# Patient Record
Sex: FEMALE | Race: WHITE | NOT HISPANIC OR LATINO | Employment: FULL TIME | URBAN - METROPOLITAN AREA
[De-identification: names, ages, dates, MRNs, and addresses within clinical notes are randomized per-mention and may not be internally consistent; named-entity substitution may affect disease eponyms.]

---

## 2022-10-12 ENCOUNTER — HOSPITAL ENCOUNTER (OUTPATIENT)
Dept: RADIOLOGY | Facility: HOSPITAL | Age: 42
Discharge: HOME | End: 2022-10-12
Attending: ORTHOPAEDIC SURGERY
Payer: COMMERCIAL

## 2022-10-12 ENCOUNTER — HOSPITAL ENCOUNTER (OUTPATIENT)
Dept: RADIOLOGY | Facility: HOSPITAL | Age: 42
Discharge: HOME | End: 2022-10-12
Attending: STUDENT IN AN ORGANIZED HEALTH CARE EDUCATION/TRAINING PROGRAM
Payer: COMMERCIAL

## 2022-10-12 ENCOUNTER — OFFICE VISIT (OUTPATIENT)
Dept: SPORTS MEDICINE | Facility: CLINIC | Age: 42
End: 2022-10-12
Payer: COMMERCIAL

## 2022-10-12 VITALS
BODY MASS INDEX: 24.91 KG/M2 | SYSTOLIC BLOOD PRESSURE: 108 MMHG | HEIGHT: 66 IN | DIASTOLIC BLOOD PRESSURE: 78 MMHG | WEIGHT: 155 LBS | HEART RATE: 66 BPM

## 2022-10-12 DIAGNOSIS — M17.11 ARTHRITIS OF KNEE, RIGHT: ICD-10-CM

## 2022-10-12 DIAGNOSIS — Z98.890 S/P RIGHT KNEE SURGERY: ICD-10-CM

## 2022-10-12 DIAGNOSIS — G89.29 CHRONIC PAIN OF BOTH KNEES: ICD-10-CM

## 2022-10-12 DIAGNOSIS — Z98.890 S/P LEFT KNEE SURGERY: ICD-10-CM

## 2022-10-12 DIAGNOSIS — M25.561 CHRONIC PAIN OF BOTH KNEES: ICD-10-CM

## 2022-10-12 DIAGNOSIS — M25.562 CHRONIC PAIN OF BOTH KNEES: Primary | ICD-10-CM

## 2022-10-12 DIAGNOSIS — M25.562 CHRONIC PAIN OF BOTH KNEES: ICD-10-CM

## 2022-10-12 DIAGNOSIS — G89.29 CHRONIC PAIN OF BOTH KNEES: Primary | ICD-10-CM

## 2022-10-12 DIAGNOSIS — M25.561 CHRONIC PAIN OF BOTH KNEES: Primary | ICD-10-CM

## 2022-10-12 DIAGNOSIS — M17.11 PRIMARY OSTEOARTHRITIS OF RIGHT KNEE: ICD-10-CM

## 2022-10-12 PROCEDURE — 99999 PR PBB SHADOW E&M-NEW PATIENT-LVL III: ICD-10-PCS | Mod: PBBFAC,,, | Performed by: ORTHOPAEDIC SURGERY

## 2022-10-12 PROCEDURE — 3008F BODY MASS INDEX DOCD: CPT | Mod: CPTII,S$GLB,, | Performed by: ORTHOPAEDIC SURGERY

## 2022-10-12 PROCEDURE — 73564 X-RAY EXAM KNEE 4 OR MORE: CPT | Mod: 26,,, | Performed by: RADIOLOGY

## 2022-10-12 PROCEDURE — 3078F PR MOST RECENT DIASTOLIC BLOOD PRESSURE < 80 MM HG: ICD-10-PCS | Mod: CPTII,S$GLB,, | Performed by: ORTHOPAEDIC SURGERY

## 2022-10-12 PROCEDURE — 3008F PR BODY MASS INDEX (BMI) DOCUMENTED: ICD-10-PCS | Mod: CPTII,S$GLB,, | Performed by: ORTHOPAEDIC SURGERY

## 2022-10-12 PROCEDURE — 3078F DIAST BP <80 MM HG: CPT | Mod: CPTII,S$GLB,, | Performed by: ORTHOPAEDIC SURGERY

## 2022-10-12 PROCEDURE — 99204 PR OFFICE/OUTPT VISIT, NEW, LEVL IV, 45-59 MIN: ICD-10-PCS | Mod: S$GLB,,, | Performed by: ORTHOPAEDIC SURGERY

## 2022-10-12 PROCEDURE — 77073 BONE LENGTH STUDIES: CPT | Mod: TC

## 2022-10-12 PROCEDURE — 73564 XR KNEE ORTHO BILAT WITH FLEXION: ICD-10-PCS | Mod: 26,,, | Performed by: RADIOLOGY

## 2022-10-12 PROCEDURE — 3074F PR MOST RECENT SYSTOLIC BLOOD PRESSURE < 130 MM HG: ICD-10-PCS | Mod: CPTII,S$GLB,, | Performed by: ORTHOPAEDIC SURGERY

## 2022-10-12 PROCEDURE — 73564 X-RAY EXAM KNEE 4 OR MORE: CPT | Mod: TC,50

## 2022-10-12 PROCEDURE — 99999 PR PBB SHADOW E&M-NEW PATIENT-LVL III: CPT | Mod: PBBFAC,,, | Performed by: ORTHOPAEDIC SURGERY

## 2022-10-12 PROCEDURE — 73560 X-RAY EXAM OF KNEE 1 OR 2: CPT | Mod: TC,RT

## 2022-10-12 PROCEDURE — 73560 X-RAY EXAM OF KNEE 1 OR 2: CPT | Mod: 26,RT,, | Performed by: RADIOLOGY

## 2022-10-12 PROCEDURE — 99204 OFFICE O/P NEW MOD 45 MIN: CPT | Mod: S$GLB,,, | Performed by: ORTHOPAEDIC SURGERY

## 2022-10-12 PROCEDURE — 77073 BONE LENGTH STUDIES: CPT | Mod: 26,,, | Performed by: RADIOLOGY

## 2022-10-12 PROCEDURE — 73560 XR KNEE 1 OR 2 VIEW RIGHT: ICD-10-PCS | Mod: 26,RT,, | Performed by: RADIOLOGY

## 2022-10-12 PROCEDURE — 3074F SYST BP LT 130 MM HG: CPT | Mod: CPTII,S$GLB,, | Performed by: ORTHOPAEDIC SURGERY

## 2022-10-12 PROCEDURE — 77073 XR HIP TO ANKLE: ICD-10-PCS | Mod: 26,,, | Performed by: RADIOLOGY

## 2022-10-12 NOTE — PATIENT INSTRUCTIONS
The knee is the most frequently injured joint in the body. Most injuries are due to the extreme stresses during twisting or turning activities or sports. The knee joint is made up of three bones, four major ligaments and two types of cartilage.    FEMUR (Thigh Bone)  The femoral condyles are the two rounded prominences at the end of the femur. The motion of the condyles include rocking, gliding and rotating. Any abnormal surface structure or cartilage damage can lead to cartilage breakdown and arthritis (loss of cartilage padding).    TIBIA (Shin Bone)  The Tibia meets the Femur at the knee in two areas on which the Femur rides. This area is called the Tibial Plateau.    PATELLA (Knee Cap)  The Patella is a bone that lies within the quadriceps tendon. It rides in the shallow groove over the front part if the Femur called the Trochlea. The Patella acts as a lever arm to help the quadriceps muscle extend the knee.    Articular Cartilage covers the ends of these bones at the knee joint. This glistening white substance has the consistency of firm rubber but is actually a mixture of collagen and special large sponge-like molecules all maintained by living cartilage cells (chondrocytes). With normal joint fluid for lubrication, the surface is more slippery than ice on ice and allows smooth and easy knee joint motion.      MENISCUS  The other type of knee cartilage is the Meniscal Cartilage (fibrocartilage). These C-shaped pads are found between the thigh bone and shin bone -- one on each side -- thus called the Medial Meniscus (inner thigh aspect) and Lateral Meniscus (outer aspect). The menisci are attached to the Tibial Plateaus, and serve to cushion and transfer joint force more evenly to the tibia. They accomplish this by distributing joint forces over a larger area of the joint -- transferring force from the curved femoral condylar margins to the flatter tibial plateaus. Damage to the meniscal cushion may result  "in increased stress on the articular cartilage of the tibia and femur and early arthritis.      The smooth, white shiny covering of the bones in the joint is known as Articular Cartilage, and is made of a material called "hyaline cartilage". Damage to this articular cartilage can occur from trauma (such as a fall or car accident), but more often, it is the result of repetitive injuries over a long period of time.    Articular cartilage injuries are common, occurring in 20- 70% of knee injuries. The function of articular cartilage is to optimize joint function by reducing friction and increasing shock absorption. Articular cartilage is similar to the "tread on a car tire".    Injuries to the articular cartilage have a low capacity for healing. Both superficial and full-thickness cartilage injuries may progress to the mechanical wear and breakdown of the cartilage matrix.  The breakdown of articular cartilage will eventually cause osteoarthritis, which is a very serious painful condition. With a full-thickness cartilage injury, continued activity may cause the articular cartilage injury to progress rapidly to traumatic arthritis. The larger the initial cartilage injury, the faster the potential progression of arthritis. Articular cartilage injury or wear leads to joint pain.      Common symptoms include pain when the joint is moved or loaded (like walking or running). Catching, locking, or creaking (crepitation) may occur with joint motion or weight bearing (like twisting or standing  from a seated position). Articular cartilage damage may be superficial (partial), deep (complete full-thickness), or osteochondral (bone and cartilage).    Superficial cartilage injuries extend into the upper 50% of the depth of the cartilage. These injuries typically do not heal, but may not progress to arthritis unless they are large and located in a weight-bearing area of the knee.  Deep or full-thickness lesions extend down to the " "bone, but not through it. These injuries have very little healing potential and tend to progress to osteoarthritis if located in a weight-bearing area.    Osteochondral (bone plus cartilage) injuries extend down through the subchondral bone (deep to the cartilage). These injuries may heal by allowing blood vessel ingrowth with fibrous cells to produce a fibrous (scar-like) tissue repair.      Treatment Options For Smaller Defects  Most studies suggest the repair tissue formed from bone marrow stimulation techniques is fibrocartilage (scar tissue -not hyaline cartilage), which is less resistant to wear with the forces in the knee joint and often deteriorates over time Bone marrow stimulation techniques can be successful in eliminating symptoms in many patients, especially young patients with small lesions.   Without early intervention, cartilage degeneration may proceed, and prevent a chance for successful pain- free function.    Arthroscopic Debridement  This is an arthroscopic procedure, often known as a "knee scope". The surgeon uses minimally invasive techniques with a small fiberoptic light source attached to a video camera to locate damaged cartilage and trim the loose edges away to smooth the surface. The surgeon may trim meniscus tears and remove loose cartilage that causes catching or locking symptoms and attempts to prevent any further flaking off that can irritate the knee joint lining and cause swelling.  Arthroscopic debridement is most effective for small lesions, less than 1 cm2 (3/8 inch). It is not as effective for larger lesions because it does not fill the lesion with any repair tissue, leaving the edges exposed to high forces in the knee and continued wear. Despite relieving some symptoms from cartilage tears or loss, arthroscopic cleaning does not prevent the progression of arthritis, but may relieve mechanical symptoms.    Bone Marrow Stimulation Techniques  Three different techniques are " "available to create bleeding and clot formation at the cartilage defect. Blood vessels and fibrous cells migrate to the area to form a fibrous scar-tissue repair tissue to fill the hole in the articular cartilage. Drilling creates multiple small holes through the subchondral bone to allow bleeding into the defect.   Microfracture or "picking" creates small fractures in the subchondral bone to encourage bleeding into the defect.      Abrasion arthroplasty is a superficial shaving of the subchondral bone surface to create bleeding into the defect. Bone marrow stimulation techniques are successful in eliminating symptoms in many patients, especially younger patients with smaller lesions well contained lesions.       For patients with more extensive cartilage damage there are several techniques available    Osteochondral Autograft  This technique is analogous to a hair-plug transfer. The surgeon removes a small section of the patient's own cartilage along with the underlying bone plug, hence the name osteochondral (bone and cartilage) graft.      Bone and cartilage cylinders are harvested from a minor weight bearing area of the knee joint and transplanted into prepared holes in the damage area. This process works much like a hair transplant. Unfortunately, a limited amount of normal osteochondral tissue is available for harvest. The typical size of defect treatable with this method is between 1 to 2 cm2.      Treatment Options For Larger Lesions    Autologous Chondrocyte Implantation (ACI) Carticel  For cartilage defects greater than 2 square centimeters or if there are multiple defects in the knee, one of the newer techniques for the cartilage regeneration, is ACI. ACI is FDA indicated for full-thickness cartilage or osteochondral lesions located in the knee.    ACI is performed in two stages. The first stage is performed when initially assessing the joint arthroscopically. A small amount of cartilage is harvested and " sent to a laboratory for cell culture and growth.    The patient's own cartilage cells (chondrocytes) are grown in culture increasing the number of cells by 10 fold.    Twelve million chondrocytes are then re-implanted into the cartilage defect and covered with a ceiling of native tissue (periosteum).    The cells then grow to fill the defect and resurface areas of cartilage loss with hyaline-like cartilage.    The long-term outcomes (of 14 years) are encouraging for treating medium and large cartilage lesions. ACI is the only procedure with a formal patient outcomes registry.    Osteochondral Allograft     For larger defects that involve both cartilage and bone loss, surgeons may custom fit the defect with a cadaver implant of donated cartilage and bone. The transplanted tissue is matched to the patient based on size of the knee, but tissue typing (similar to organ transplantation) is not necessary. Osteochondral transplantation may allow restoration of the joint surface. The long-term outcomes with fresh allograft (cadaver) tissue have been very encouraging.      RESTORING THE MENISCUS  Our goal is to maintain normal anatomy, if possible. In the case of meniscal tears, the first line of treatment is attempt at repair. Historically, in the days of open cartilage surgery, the entire meniscus was removed. Unfortunately, long term follow-up studies of these patients after removal of the meniscus have found that many go on to degenerative arthritis. Today, cartilage surgeons recognize the protective value of the meniscal cartilage and make every attempt to conserve this valuable tissue.    To maximally preserve function after a meniscus tear, surgeons may repair the meniscus using a variety of techniques. Options include using special sutures or absorbable implants to staple, daysi, or otherwise fix and secure the torn meniscal cushion.    Replacing The Meniscus  For patients who have had the meniscus removed, an  innovative solution called a meniscal transplant may be an option. The indications for transplant need to be assessed by your cartilage surgeon. Unlike some other forms of tissue transplantation, this procedure does not require patients to be on medications to prevent organ rejection. Intermediate term outcome studies are encouraging.        RESTORING KNEE ALIGNMENT    Osteotomy  When the bones of the knee do not align properly, joint forces are not evenly distributed and may overload one side of the knee causing pain and cartilage degeneration. This overload problem is made worse when there has been a traumatic cartilage injury or the meniscus has been removed.    An osteotomy is designed to shift the stress from the damaged part of the knee to a more normal area in the knee. An osteotomy requires the surgeon to cut the bone in order to remove a wedge of bone in order to adjust the angle of the knee. For individuals with medial compartment narrowing (the most common situation), there are three options for high tibial osteotomy (HTO).      One involves removing a wedge shape piece of bone from the lateral side of the tibia and then closing the remaining surfaces together and holding it with a plate and screws (Fig A).    The second technique involves making one cut partially across the top of the tibia and opening the bone to establish the correct alignment. This is held in place with a plate and screws and a bone graft. (Fig B)    The final technique is called medial hemicallotasis, which means stretching healing bone. This technique requires a single cut partially across the bone. The bone is then gradually opened on the medial side by an external fixation frame. This technique is attractive because it allows adjustments to be made in the angle of correction and the hardware is removed three months after the procedure (Fig C).      Each of these techniques require a period of non-weightbearing or partial  "weightbearing crutch ambulation. These techniques may be necessary at the same time or prior to other reconstructive procedures such as cartilage replacement or meniscus replacement surgery in order to remove excessive forces off the repair site.      OSTEOARTHRITIS    Partial (Unicompartmental) Joint Replacement  This procedure replaces only the damaged portion of the joint with metal and/or plastic, leaving the remaining portion of the joint intact. It is often known as a partial knee replacement. New techniques allow replacement of a portion of the knee joint through smaller incisions with fewer days of hospitalization required.    Total Joint Replacement  If there is extensive damage with bone-on-bone apposition, many of the above procedures are not indicated. In these cases of end-stage arthritis, the entire joint surface is replaced with artificial metal and plastic components, allowing most patients to return to pain-free activities.    Future Trends  Investigators are now examining the potential of using collagen or other biologic tissues to serve as a bridge or scaffold for the body's own healing/repair mechanism to use in reestablishing meniscal form and function. In the future, biological "healing glues" may become available to allow repair without sutures. Even with the newest techniques available, certain tears are not repairable and a portion of the meniscus is removed using the arthroscope (partial meniscectomy).    The term osteoarthritis indicates the degeneration and excessive wear of articular cartilage with gradual changes occurring in the underlying bone.    Initially the articular cartilage softens, the surface becomes uneven and the cartilage frays and develops cracks, which can extend down to bone.  In advanced osteoarthritis the cartilage is worn away to reveal the underlying bone and nerve endings causing pain.  The bone hardens, cysts begin to form, and new cartilage cells laid down around " the worn cartilage ossify and form bony projections (bone spurs).  Untreated articular cartilage defects can progress to osteoarthritis with both mechanical and enzymatic breakdown resulting in permanent dysfunction of the joint. Our goal is to diagnose and prevent or halt the progression of arthritis      Many patients suffer with end-stage arthritis that limits even the simplest activities of daily living, significantly altering the patient's quality of fife. For these patient's, current cartilage surgical options DO NOT apply. There are no curative therapies for end-stage arthritis. A wide range of methods may be used to relieve pain and restore function. Conventional treatment methods include exercise, physical therapy and medications, such as anti-inflammatories. Recently, new biological compounds have been shown to be effective and safe for treating arthritis. These compounds include lubricants (hyaluronic acid) and building blocks of cartilage (Chondroitin Sulfate and Glucosamine).    Medications  Oral medications such as non-steroidal anti-inflammatories (NSAID's) tend to have a beneficial effect on the degenerative conditions described above. Immediately following an injury, these medications help to decrease pain and swelling. On a more long term nature, these medications help to relieve the pain and swelling associated with arthritic joints. Newer specific anti-inflammatories medications have been developed to block the enzymes necessary for production of inflammation (cyclooxygenase-2 or ALCARAZ-2). These medications, such as Ceibrex or Bextra tend to have less adverse effects on the stomach and gastrointestinal tract than older, more traditional NSAID's. These prescription-strength NSAID medications are taken by mouth once or twice per day. Other non-prescription over-the-counter NSAID's are available.    Cartilage Supplements  Other oral medications which can be purchased without a prescription include  Glucosamine and Chondroitin Sulfate. These two compounds are normally found in the substance of articular cartilage. Several recent studies using Cosamin®DS have demonstrated a pain relieving effect with the combination of Glucosamine Hydrochloride, highly purified low molecular weight Chondroitin Sulfate and Manganese Ascorbate available only in this product. The National Institutes of Health ([facility]) has selected the exact same Glucosamine and Chondroitin Sulfate used in CosaminDS for a large multi-center clinical trial currently in progress.      Oral administration of these compounds does not have a cartilage building effect, but rather a cartilage sparing effect. Laboratory studies have confirmed a stimulatory action on cartilage cells as well as an inhibition of cartilage degeneration with CosaminDS, which can improve the health of existing cartilage. Few negative side-effects have been demonstrated in patients taking these compounds, and many patients with arthritis benefit from the addition of this supplement to their arthritis treatment regimen.    Cortisone (Steroid) Injections  Injection of steroids into joints have been performed for well over 100 years with good results. Typically, steroid injection is utilized in a patient with degenerative arthritic changes to treat acute inflammation.  Steroids are powerful anti-inflammatory substances. When injected into a joint, the steroid has primarily a local effect, not a whole-body or systemic effect. These medications tend to decrease pain and inflammation when used appropriately. If overused, local steroid injection can have a negative effect on the tissues, such as weakening of bone and tendons. These injections typically are not permanent in their beneficial effect.    Injectable Viscosupplementation  The space between the cartilage surfaces in the knee joint contains synovial fluid that acts as a lubricant for the joint. With the progression of arthritis,  "the synovial fluid becomes thinner and is ineffective as a shock absorber. As a result simple movements become painful. Hyaluronic acid injections supplement the existing synovial fluid and act as a shock absorber and lubricant for the knee.      Synvisc is a hyluronan based, naturally occurring lubricant with similar properties to synovial fluid found in healthy joints. Synvisc or Euflexxa are injected over a 3 week series to provide lubrication to the knee joint. For some patients, Synvisc One may be an option, this is a single-shot injection.    Braces  In some cases of arthritis, only a portion of the knee is degenerative and it may be advantageous to "unload" the affected area and force more weight towards the "good" part of the knee. Special braces called "unloaders" are used for this purpose. Typically the brace is worn for work or activity and tends to decrease the pain caused by single compartment arthritis.        Physical Therapy  Exercise is a vital component of the treatment of cartilage injury. If the knee becomes stiff after an injury or over the course of time, it may be difficult to regain the lost motion. In most cases, early range of motion exercises are instituted in order to prevent this problem. In some cases, a loss of strength in certain muscle groups can lead to increased stress on the already degenerative articular surfaces. If muscles are strong and working properly they will take up some of the stress and divert it away from the cartilage surfaces. As symptoms decrease exercise is increased, but always below the pain threshold. Muscle strength is never harmful to a joint. It is therefore common to have a doctor prescribe strengthening exercises as an integral part of the treatment program for arthritis.    TECHNIQUES  Biologic tissue engineering is continuing to bring exciting new approaches from the lab to the clinical arena. It may be possible to induce primitive cells from the bone " "marrow called pluripotential cells or mesenchymal stem cells to transform into hyaline articular cartilage with the use of a variety of growth factors or hormones. Genetic reprogram¬marvin and tissue engineering may eventually replace surgery - but not at this stage in the new millennium.  Other biological patches may act as a temporary home for chondrocytes or "prechondrocytes." This exciting field will offer many new surgical techniques, which will hopefully lead to opportunities to restore function with less invasive means.      DESCRIPTION  Synvisc-One (hylan G-F 20) is an elastoviscous high molecular weight fluid containing hylan A and hylan B polymers produced from chicken handley. Hylans are derivatives of hyaluronan (sodium hyaluronate). Hylan G-F 20 is unique in that the hyaluronan is chemically cross linked. Hyaluronan is a long-chain polymer containing repeating disaccharide units of [id number]-acetylglucosamine.     INDICATIONS FOR USE  Synvisc-One is indicated for the treatment of pain in osteoarthritis (OA) of the knee in patients who have failed to respond adequately to conservative nonpharmacologic therapy and simple analgesics, e.g., acetaminophen.     Who is a candidate for Synvisc-One  Patients with knee osteoarthritis, who have tried diet, exercise and over-the-counter pain medication but still have pain, should talk to their doctor to see if Synvisc-One is right for them.     How Synvisc-One is administered  Synvisc-One is a single injection. It's a simple, in-office procedure that only takes a few minutes.     What you can expect following a Synvisc-One knee injection Synvisc-One can provide up to six months of osteoarthritis knee pain relief. Everyone responds differently, but in a medical study* patients experienced relief starting one month after the injection.     After the injection, you can resume normal day-to-day activities, but you should avoid any strenuous activities for about 48 " hours.     Contraindication  Do not administer to patients with known hypersensitivity (allergy) to hyaluronan (sodium hyaluronate) preparations.   Do not inject Synvisc-One in the knees of patients having knee joint infections or skin diseases or infections in the area of theinjection site.    What are possible side effects?  Synvisc may occur short-term pain, swelling at the injection site and / or the appearance of synovial exudate after injection. In some cases, exudation may be significant and cause more prolonged pain.     Important Safety Information  Before trying Synvisc-One or SYNVISC, tell your doctor if you are allergic to products from birds - such as feathers, eggs or poultry - or if your leg is swollen or infected. Synvisc-One and SYNVISC are only for injection into the knee, performed by a doctor or other qualified health care professional. Synvisc-One and SYNVISC have not been tested to show pain relief in joints other than the knee. Talk to your doctor before resuming strenuous weight-bearing activities after treatment. Synvisc-One and SYNVISC have not been tested in children, pregnant women or women who are nursing. You should tell your doctor if you think you are pregnant or if you are nursing a child. The side effects most commonly seen when Synvisc-One or SYNVISC is injected into the knee were pain, swelling and/or fluid buildup in or around the knee. Cases where the swelling is extensive or painful should be discussed with your doctor. Allergic reactions such as rash and hives have been reported rarely.

## 2022-10-12 NOTE — PROGRESS NOTES
Subjective:          Chief Complaint: Jacqui Nobles is a 42 y.o. female who had concerns including Pain of the Right Knee.    Jacqui Nobles is a 42 y.o. female, who is athletically active in soccer here for evaluation of her right  Knee. The pain started 20 years ago after pivot/twist mechanism of injury and is becoming progressively worse. Pain is located over (points to) lateral. She reports that the pain is a 3 /10 aching pain today. She reports NSAIDS  with no improvement  Cortisone injection  with no improvement  Therapy  with no improvement  Surgery  with no improvement previous treatments. Pain is affecting ADLs and limiting desired level of activity. Denies numbness, tingling, radiation, and inability to bear weight.  Pain is 7 /10 at its worst    Mechanical symptoms:giving way  Subjective instability: (+)   Worse with activity  Better with rest  Nocturnal symptoms: (+)     Multiple R knee surgeries including microfx, multiple ACL recons, meniscal repairs in the past. Last surgery 6-8 years ago             Review of Systems   Constitutional: Negative for fever and night sweats.   HENT:  Negative for hearing loss.    Eyes:  Negative for blurred vision and visual disturbance.   Cardiovascular:  Negative for chest pain and leg swelling.   Respiratory:  Negative for shortness of breath.    Endocrine: Negative for polyuria.   Hematologic/Lymphatic: Negative for bleeding problem.   Skin:  Negative for rash.   Musculoskeletal:  Negative for back pain, joint pain, joint swelling, muscle cramps and muscle weakness.   Gastrointestinal:  Negative for melena.   Genitourinary:  Negative for hematuria.   Neurological:  Negative for loss of balance, numbness and paresthesias.   Psychiatric/Behavioral:  Negative for altered mental status.      Pain Related Questions  Over the past 3 days, what was your average pain during activity? (I.e. running, jogging, walking, climbing stairs, getting dressed, ect.): 0  Over the past 3  days, what was your highest pain level?: 0  Over the past 3 days, what was your lowest pain level? : 0    Other  How many nights a week are you awakened by your affected body part?: 0  Was the patient's HEIGHT measured or patient reported?: Patient Reported  Was the patient's WEIGHT measured or patient reported?: Patient Reported      Objective:        General: Jacqui is well-developed, well-nourished, appears stated age, in no acute distress, alert and oriented to time, place and person.     General    Vitals reviewed.  Constitutional: She is oriented to person, place, and time. She appears well-developed and well-nourished. No distress.   HENT:   Mouth/Throat: No oropharyngeal exudate.   Eyes: Right eye exhibits no discharge. Left eye exhibits no discharge.   Pulmonary/Chest: Effort normal and breath sounds normal. No respiratory distress.   Neurological: She is alert and oriented to person, place, and time. She has normal reflexes. No cranial nerve deficit. Coordination normal.   Psychiatric: She has a normal mood and affect. Her behavior is normal. Judgment and thought content normal.     General Musculoskeletal Exam   Gait: normal       Right Knee Exam     Inspection   Erythema: absent  Scars: present  Swelling: absent  Effusion: absent  Deformity: absent  Bruising: absent    Tenderness   The patient is tender to palpation of the lateral joint line.    Range of Motion   Extension:  -5 normal   Flexion:  140 normal     Tests   Meniscus   Marylin:  Medial - negative Lateral - negative  Ligament Examination   Lachman: abnormal - grade I  PCL-Posterior Drawer: normal (0 to 2mm)     MCL - Valgus: normal (0 to 2mm)  LCL - Varus: normal  Pivot Shift: abnormal- grade II  Reverse Pivot Shift: normal (Equal)  Dial Test at 30 degrees: normal (< 5 degrees)  Dial Test at 90 degrees: normal (< 5 degrees)  Posterior Sag Test: negative  Posterolateral Corner: stable  Patella   Patellar apprehension: negative  Passive Patellar  Tilt: neutral  Patellar Tracking: normal  Patellar Glide (quadrants): Lateral - 1   Medial - 2  Q-Angle at 90 degrees: normal  Patellar Grind: negative  J-Sign: none    Other   Meniscal Cyst: absent  Popliteal (Baker's) Cyst: absent  Sensation: normal    Left Knee Exam     Inspection   Erythema: absent  Scars: present  Swelling: absent  Effusion: absent  Deformity: absent  Bruising: absent    Tenderness   The patient is experiencing no tenderness.     Range of Motion   Extension:  -5   Flexion:  140 normal     Tests   Meniscus   Marylin:  Medial - negative Lateral - negative  Stability   Lachman: abnormal  - grade II  PCL-Posterior Drawer: normal (0 to 2mm)  MCL - Valgus: normal (0 to 2mm)  LCL - Varus: normal (0 to 2mm)  Pivot Shift: abnormal - grade II  Reverse Pivot Shift: normal (Equal)  Dial Test at 30 degrees: normal (< 5 degrees)  Dial Test at 90 degrees: normal (< 5 degrees)  Posterior Sag Test: negative  Posterolateral Corner: stable  Patella   Patellar apprehension: negative  Passive Patellar Tilt: neutral  Patellar Tracking: normal  Patellar Glide (Quadrants): Lateral - 1 Medial - 2  Q-Angle at 90 degrees: normal  Patellar Grind: negative  J-Sign: J sign absent    Other   Meniscal Cyst: absent  Popliteal (Baker's) Cyst: absent  Sensation: normal    Right Hip Exam     Tests   Zack: negative  Left Hip Exam     Tests   Zack: negative          Muscle Strength   Right Lower Extremity   Hip Abduction: 5/5   Quadriceps:  5/5   Hamstrin/5   Left Lower Extremity   Hip Abduction: 5/5   Quadriceps:  5/5   Hamstrin/5     Reflexes     Left Side  Achilles:  2+  Quadriceps:  2+    Right Side   Achilles:  2+  Quadriceps:  2+    Vascular Exam     Right Pulses  Dorsalis Pedis:      2+  Posterior Tibial:      2+        Left Pulses  Dorsalis Pedis:      2+  Posterior Tibial:      2+        Multiple xray view of bilateral knees were reviewed in clinic today. Thus shows severe narrowing of the r knee lateral joint  line. Post op changes of ACL reconstructions are seen bilaterally. Vertically oriented interference screw noted in the R femur         Assessment:       Encounter Diagnoses   Name Primary?    Chronic pain of both knees Yes    S/P right knee surgery     S/P left knee surgery     Arthritis of knee, right           Plan:       1. RTC in 3 weeks with Dr. Skylar Palmer. IKDC, SF-12 and KOOS was filled out today in clinic. Patient will fill out IKDC, SF-12 and KOOS on return.    2. Medications: Refills of the following Rx were sent to patients preferred Pharmacy:  No Refills Needed Today    3. Physical Therapy:     NA    4. HEP: N/A    5. Procedures/Procedural Planning:   Prior authorization for right Knee Synvisc-One to be completed the next several weeks placed today.  We have discussed a variety of treatment options including medications, injections, physical therapy and other alternative treatments. Patient's pain is refractory to long-term use of PO/topical NSAIDs, physical therapy 6+ weeks, aerobic exercise, weight-loss, walking aids, visco-supplementation, and multiple corticosteroid injections, and the current treatment is the only effective treatment recommended at this time. Also recommending to continue HEP.  Patient agrees with treatment plan. Radiographs show osteoarthritis of bilateral knees with Kellgren Augustin scale of 2    6. DME: Lateral  measured today     7. Work/Sport Status: Limit high impact running and jumping at this time. Ok for dance    8. Visit Summary: Follow up for synvisc injection once approved                           [facility] patient questionnaires have been collected today.

## 2022-11-07 ENCOUNTER — OFFICE VISIT (OUTPATIENT)
Dept: SPORTS MEDICINE | Facility: CLINIC | Age: 42
End: 2022-11-07
Payer: COMMERCIAL

## 2022-11-07 VITALS
HEIGHT: 66 IN | BODY MASS INDEX: 24.21 KG/M2 | HEART RATE: 58 BPM | WEIGHT: 150.63 LBS | DIASTOLIC BLOOD PRESSURE: 70 MMHG | SYSTOLIC BLOOD PRESSURE: 106 MMHG

## 2022-11-07 DIAGNOSIS — M25.561 CHRONIC PAIN OF RIGHT KNEE: ICD-10-CM

## 2022-11-07 DIAGNOSIS — M17.11 PRIMARY OSTEOARTHRITIS OF RIGHT KNEE: Primary | ICD-10-CM

## 2022-11-07 DIAGNOSIS — G89.29 CHRONIC PAIN OF RIGHT KNEE: ICD-10-CM

## 2022-11-07 PROCEDURE — 3078F PR MOST RECENT DIASTOLIC BLOOD PRESSURE < 80 MM HG: ICD-10-PCS | Mod: CPTII,S$GLB,, | Performed by: ORTHOPAEDIC SURGERY

## 2022-11-07 PROCEDURE — 3074F PR MOST RECENT SYSTOLIC BLOOD PRESSURE < 130 MM HG: ICD-10-PCS | Mod: CPTII,S$GLB,, | Performed by: ORTHOPAEDIC SURGERY

## 2022-11-07 PROCEDURE — 99499 UNLISTED E&M SERVICE: CPT | Mod: S$GLB,,, | Performed by: ORTHOPAEDIC SURGERY

## 2022-11-07 PROCEDURE — 3008F BODY MASS INDEX DOCD: CPT | Mod: CPTII,S$GLB,, | Performed by: ORTHOPAEDIC SURGERY

## 2022-11-07 PROCEDURE — 20611 PR DRAIN/ASP/INJECT MAJOR JOINT/BURSA W/US GUIDANCE: ICD-10-PCS | Mod: RT,S$GLB,, | Performed by: ORTHOPAEDIC SURGERY

## 2022-11-07 PROCEDURE — 1160F RVW MEDS BY RX/DR IN RCRD: CPT | Mod: CPTII,S$GLB,, | Performed by: ORTHOPAEDIC SURGERY

## 2022-11-07 PROCEDURE — 3008F PR BODY MASS INDEX (BMI) DOCUMENTED: ICD-10-PCS | Mod: CPTII,S$GLB,, | Performed by: ORTHOPAEDIC SURGERY

## 2022-11-07 PROCEDURE — 1159F PR MEDICATION LIST DOCUMENTED IN MEDICAL RECORD: ICD-10-PCS | Mod: CPTII,S$GLB,, | Performed by: ORTHOPAEDIC SURGERY

## 2022-11-07 PROCEDURE — 99499 NO LOS: ICD-10-PCS | Mod: S$GLB,,, | Performed by: ORTHOPAEDIC SURGERY

## 2022-11-07 PROCEDURE — 3074F SYST BP LT 130 MM HG: CPT | Mod: CPTII,S$GLB,, | Performed by: ORTHOPAEDIC SURGERY

## 2022-11-07 PROCEDURE — 20611 DRAIN/INJ JOINT/BURSA W/US: CPT | Mod: RT,S$GLB,, | Performed by: ORTHOPAEDIC SURGERY

## 2022-11-07 PROCEDURE — 3078F DIAST BP <80 MM HG: CPT | Mod: CPTII,S$GLB,, | Performed by: ORTHOPAEDIC SURGERY

## 2022-11-07 PROCEDURE — 1160F PR REVIEW ALL MEDS BY PRESCRIBER/CLIN PHARMACIST DOCUMENTED: ICD-10-PCS | Mod: CPTII,S$GLB,, | Performed by: ORTHOPAEDIC SURGERY

## 2022-11-07 PROCEDURE — 1159F MED LIST DOCD IN RCRD: CPT | Mod: CPTII,S$GLB,, | Performed by: ORTHOPAEDIC SURGERY

## 2022-11-07 PROCEDURE — 99999 PR PBB SHADOW E&M-EST. PATIENT-LVL III: CPT | Mod: PBBFAC,,, | Performed by: ORTHOPAEDIC SURGERY

## 2022-11-07 PROCEDURE — 99999 PR PBB SHADOW E&M-EST. PATIENT-LVL III: ICD-10-PCS | Mod: PBBFAC,,, | Performed by: ORTHOPAEDIC SURGERY

## 2022-11-07 NOTE — PATIENT INSTRUCTIONS
What is Cosamin® ASU?    Cosamin ASU is an advanced proprietary formulation that combines [id number]® avocado/soybean unsaponifiables (ASU) with Cosamin's FCHG49® glucosamine and pharmaceutical-grade HFR291® sodium chondroitin sulfate.    For over a decade, Cosamin DS has served as the premium joint health supplement on the market. By combining the exclusive ingredients found in Cosamin DS with ASU, Iotera, DotProduct. has made the best even better.    Cosamin ASU is a dual synergistic formula: its specific combination of glucosamine and sodium chondroitin sulfate have demonstrated synergy in stimulating cartilage production,1 while ASU also acts synergistically with glucosamine.  What is ASU and how does it work?    ASU (avocado/soybean unsaponifiables) is obtained from avocados and soybeans. A potent ingredient demonstrated to protect cartilage which leads to improved joint function, ASU complements the effects of the other ingredients. While working through their own primary mechanisms of action, ASU, glucosamine and chondroitin sulfate together deliver comprehensive joint health support. Our trademarked glucosamine hydrochloride, chondroitin sulfate, and avocado/soybean unsaponifiables together were shown in cell studies to be better than the combination of glucosamine and chondroitin sulfate at inhibiting expression of several agents involved in cartilage breakdown.    Cosamin ASU is available at leading pharmacies nationwide (Tracked.com) and online.    How do I take Cosamin® ASU?        Maximum Protection:      Take 4 capsules per day. Capsules may be taken all at once or divided with meals throughout the day.           Maintenance:      Once desired effect is noticed, you may gradually reduce the number of capsules to maintain comfort level.      Some individuals may respond sooner than others depending on the status of their cartilage and joint health. Once response has been seen, the number of  capsules per day may be decreased to maintain comfort level. Some individuals on this lower level may wish to go back to four capsules a day during the weekends or times of increased activity.      The maintenance level can also be used long-term to help maintain healthy joints. At any time, the number of capsules may be increased back to four capsules per day.      Where to Buy Cosamin® ASU?:    Retail Stores:        [employer]      [employer]      [facility]      [facility]      [employer]      [facility]      [facility]      [employer]      [facility]      [employer]      [employer]     Online Stores:        [email or web address]      [email or web address]      [email or web address]      [email or web address]      [email or web address]      [email or web address]      [email or web address]      [email or web address]      [email or web address]      [email or web address]      [email or web address]      [email or web address]

## 2022-11-07 NOTE — PROGRESS NOTES
Subjective:          Chief Complaint: Jacqui Nobles is a 42 y.o. female who had concerns including Injections of the Right Knee.    Jacqui presents to clinic today for right knee Synvisc one injection.     Previous HPI: 10/12/22  Jacqui Nobles is a 42 y.o. female, who is athletically active in soccer here for evaluation of her right  Knee. The pain started 20 years ago after pivot/twist mechanism of injury and is becoming progressively worse. Pain is located over (points to) lateral. She reports that the pain is a 3 /10 aching pain today. She reports NSAIDS  with no improvement  Cortisone injection  with no improvement  Therapy  with no improvement  Surgery  with no improvement previous treatments. Pain is affecting ADLs and limiting desired level of activity. Denies numbness, tingling, radiation, and inability to bear weight.  Pain is 7 /10 at its worst    Mechanical symptoms:giving way  Subjective instability: (+)   Worse with activity  Better with rest  Nocturnal symptoms: (+)     Multiple R knee surgeries including microfx, multiple ACL recons, meniscal repairs in the past. Last surgery 6-8 years ago             Review of Systems   Constitutional: Negative for fever and night sweats.   HENT:  Negative for hearing loss.    Eyes:  Negative for blurred vision and visual disturbance.   Cardiovascular:  Negative for chest pain and leg swelling.   Respiratory:  Negative for shortness of breath.    Endocrine: Negative for polyuria.   Hematologic/Lymphatic: Negative for bleeding problem.   Skin:  Negative for rash.   Musculoskeletal:  Positive for joint pain. Negative for back pain, joint swelling, muscle cramps and muscle weakness.   Gastrointestinal:  Negative for melena.   Genitourinary:  Negative for hematuria.   Neurological:  Negative for loss of balance, numbness and paresthesias.   Psychiatric/Behavioral:  Negative for altered mental status.                  Objective:        General: Jacqui is well-developed,  well-nourished, appears stated age, in no acute distress, alert and oriented to time, place and person.     General    Vitals reviewed.  Constitutional: She is oriented to person, place, and time. She appears well-developed and well-nourished. No distress.   HENT:   Mouth/Throat: No oropharyngeal exudate.   Eyes: Right eye exhibits no discharge. Left eye exhibits no discharge.   Pulmonary/Chest: Effort normal and breath sounds normal. No respiratory distress.   Neurological: She is alert and oriented to person, place, and time. She has normal reflexes. No cranial nerve deficit. Coordination normal.   Psychiatric: She has a normal mood and affect. Her behavior is normal. Judgment and thought content normal.     General Musculoskeletal Exam   Gait: normal       Right Knee Exam     Inspection   Erythema: absent  Scars: present  Swelling: absent  Effusion: absent  Deformity: absent  Bruising: absent    Tenderness   The patient is tender to palpation of the lateral joint line.    Range of Motion   Extension:  -5 normal   Flexion:  140 normal     Tests   Meniscus   Marylin:  Medial - negative Lateral - negative  Ligament Examination   Lachman: abnormal - grade I  PCL-Posterior Drawer: normal (0 to 2mm)     MCL - Valgus: normal (0 to 2mm)  LCL - Varus: normal  Pivot Shift: abnormal- grade II  Reverse Pivot Shift: normal (Equal)  Dial Test at 30 degrees: normal (< 5 degrees)  Dial Test at 90 degrees: normal (< 5 degrees)  Posterior Sag Test: negative  Posterolateral Corner: stable  Patella   Patellar apprehension: negative  Passive Patellar Tilt: neutral  Patellar Tracking: normal  Patellar Glide (quadrants): Lateral - 1   Medial - 2  Q-Angle at 90 degrees: normal  Patellar Grind: negative  J-Sign: none    Other   Meniscal Cyst: absent  Popliteal (Baker's) Cyst: absent  Sensation: normal    Left Knee Exam     Inspection   Erythema: absent  Scars: present  Swelling: absent  Effusion: absent  Deformity: absent  Bruising:  absent    Tenderness   The patient is experiencing no tenderness.     Range of Motion   Extension:  -5   Flexion:  140 normal     Tests   Meniscus   Marylin:  Medial - negative Lateral - negative  Stability   Lachman: abnormal  - grade II  PCL-Posterior Drawer: normal (0 to 2mm)  MCL - Valgus: normal (0 to 2mm)  LCL - Varus: normal (0 to 2mm)  Pivot Shift: abnormal - grade II  Reverse Pivot Shift: normal (Equal)  Dial Test at 30 degrees: normal (< 5 degrees)  Dial Test at 90 degrees: normal (< 5 degrees)  Posterior Sag Test: negative  Posterolateral Corner: stable  Patella   Patellar apprehension: negative  Passive Patellar Tilt: neutral  Patellar Tracking: normal  Patellar Glide (Quadrants): Lateral - 1 Medial - 2  Q-Angle at 90 degrees: normal  Patellar Grind: negative  J-Sign: J sign absent    Other   Meniscal Cyst: absent  Popliteal (Baker's) Cyst: absent  Sensation: normal    Right Hip Exam     Tests   Zack: negative  Left Hip Exam     Tests   Zack: negative          Muscle Strength   Right Lower Extremity   Hip Abduction: 5/5   Quadriceps:  5/5   Hamstrin/5   Left Lower Extremity   Hip Abduction: 5/5   Quadriceps:  5/5   Hamstrin/5     Reflexes     Left Side  Achilles:  2+  Quadriceps:  2+    Right Side   Achilles:  2+  Quadriceps:  2+    Vascular Exam     Right Pulses  Dorsalis Pedis:      2+  Posterior Tibial:      2+        Left Pulses  Dorsalis Pedis:      2+  Posterior Tibial:      2+        Multiple xray view of bilateral knees were reviewed in clinic today. Thus shows severe narrowing of the r knee lateral joint line. Post op changes of ACL reconstructions are seen bilaterally. Vertically oriented interference screw noted in the R femur         Assessment:       Encounter Diagnoses   Name Primary?    Chronic pain of right knee     Primary osteoarthritis of right knee Yes          Plan:       1. RTC in 3-4 months with Dr. Skylar Palmer.     2.Injection Procedure right knee  A time out was  "performed, including verification of patient ID, procedure, site and side, availability of information and equipment, review of safety issues, and agreement with consent, the procedure site was marked.    After time out was performed, the patient was prepped aseptically with povidone-iodine swabsticks. A diagnostic and therapeutic injection of 6cc SynviscOne was given under sterile technique using a 22g x 1.5 needle from the Superolateral  aspect of the right Knee Joint in the supine position.      Jacqui Nobles had no adverse reactions to the medication. Pain decreased. She was instructed to apply ice to the joint for 20 minutes and avoid strenuous activities for 24-36 hours following the injection. She was warned of possible blood sugar and/or blood pressure changes during that time. Following that time, she can resume regular activities.    She was reminded to call the clinic immediately for any adverse side effects as explained in clinic today.    Description of ultrasound utilization for needle guidance:   Ultrasound guidance used for needle localization. Images saved and stored for documentation. The knee joint was visualized. Dynamic visualization of the 22g x 1.5" needle was continuous throughout the procedure.     3. DME: Lateral  ordered    4. Work/Sport Status: Limit high impact running and jumping at this time. Ok for aerial as tolerated.    5. Patient declined anti-inflammatories. Recommended Glucosamine.                          [facility] patient questionnaires have been collected today.        " yes

## 2023-03-27 ENCOUNTER — OFFICE VISIT (OUTPATIENT)
Dept: SPORTS MEDICINE | Facility: CLINIC | Age: 43
End: 2023-03-27
Payer: COMMERCIAL

## 2023-03-27 VITALS
DIASTOLIC BLOOD PRESSURE: 60 MMHG | HEART RATE: 66 BPM | BODY MASS INDEX: 25.12 KG/M2 | WEIGHT: 156.31 LBS | HEIGHT: 66 IN | SYSTOLIC BLOOD PRESSURE: 109 MMHG

## 2023-03-27 DIAGNOSIS — M23.200 OTHER OLD TEAR OF LATERAL MENISCUS OF RIGHT KNEE: ICD-10-CM

## 2023-03-27 DIAGNOSIS — M25.562 CHRONIC PAIN OF BOTH KNEES: ICD-10-CM

## 2023-03-27 DIAGNOSIS — M94.262 CHONDROMALACIA OF LEFT KNEE: ICD-10-CM

## 2023-03-27 DIAGNOSIS — M94.261 CHONDROMALACIA OF RIGHT KNEE: ICD-10-CM

## 2023-03-27 DIAGNOSIS — M21.061 ACQUIRED VALGUS DEFORMITY KNEE, RIGHT: ICD-10-CM

## 2023-03-27 DIAGNOSIS — M25.561 CHRONIC PAIN OF BOTH KNEES: ICD-10-CM

## 2023-03-27 DIAGNOSIS — M21.162 ACQUIRED VARUS DEFORMITY KNEE, LEFT: ICD-10-CM

## 2023-03-27 DIAGNOSIS — Z98.890 S/P RIGHT KNEE SURGERY: Primary | ICD-10-CM

## 2023-03-27 DIAGNOSIS — G89.29 CHRONIC PAIN OF BOTH KNEES: ICD-10-CM

## 2023-03-27 PROCEDURE — 99999 PR PBB SHADOW E&M-EST. PATIENT-LVL III: ICD-10-PCS | Mod: PBBFAC,,, | Performed by: ORTHOPAEDIC SURGERY

## 2023-03-27 PROCEDURE — 1160F RVW MEDS BY RX/DR IN RCRD: CPT | Mod: CPTII,S$GLB,, | Performed by: ORTHOPAEDIC SURGERY

## 2023-03-27 PROCEDURE — 3074F PR MOST RECENT SYSTOLIC BLOOD PRESSURE < 130 MM HG: ICD-10-PCS | Mod: CPTII,S$GLB,, | Performed by: ORTHOPAEDIC SURGERY

## 2023-03-27 PROCEDURE — 1159F PR MEDICATION LIST DOCUMENTED IN MEDICAL RECORD: ICD-10-PCS | Mod: CPTII,S$GLB,, | Performed by: ORTHOPAEDIC SURGERY

## 2023-03-27 PROCEDURE — 1159F MED LIST DOCD IN RCRD: CPT | Mod: CPTII,S$GLB,, | Performed by: ORTHOPAEDIC SURGERY

## 2023-03-27 PROCEDURE — 3078F PR MOST RECENT DIASTOLIC BLOOD PRESSURE < 80 MM HG: ICD-10-PCS | Mod: CPTII,S$GLB,, | Performed by: ORTHOPAEDIC SURGERY

## 2023-03-27 PROCEDURE — 99999 PR PBB SHADOW E&M-EST. PATIENT-LVL III: CPT | Mod: PBBFAC,,, | Performed by: ORTHOPAEDIC SURGERY

## 2023-03-27 PROCEDURE — 3078F DIAST BP <80 MM HG: CPT | Mod: CPTII,S$GLB,, | Performed by: ORTHOPAEDIC SURGERY

## 2023-03-27 PROCEDURE — 3074F SYST BP LT 130 MM HG: CPT | Mod: CPTII,S$GLB,, | Performed by: ORTHOPAEDIC SURGERY

## 2023-03-27 PROCEDURE — 99214 PR OFFICE/OUTPT VISIT, EST, LEVL IV, 30-39 MIN: ICD-10-PCS | Mod: S$GLB,,, | Performed by: ORTHOPAEDIC SURGERY

## 2023-03-27 PROCEDURE — 3008F PR BODY MASS INDEX (BMI) DOCUMENTED: ICD-10-PCS | Mod: CPTII,S$GLB,, | Performed by: ORTHOPAEDIC SURGERY

## 2023-03-27 PROCEDURE — 99214 OFFICE O/P EST MOD 30 MIN: CPT | Mod: S$GLB,,, | Performed by: ORTHOPAEDIC SURGERY

## 2023-03-27 PROCEDURE — 3008F BODY MASS INDEX DOCD: CPT | Mod: CPTII,S$GLB,, | Performed by: ORTHOPAEDIC SURGERY

## 2023-03-27 PROCEDURE — 1160F PR REVIEW ALL MEDS BY PRESCRIBER/CLIN PHARMACIST DOCUMENTED: ICD-10-PCS | Mod: CPTII,S$GLB,, | Performed by: ORTHOPAEDIC SURGERY

## 2023-03-27 NOTE — PROGRESS NOTES
Subjective:          Chief Complaint: Jacqui Nobles is a 42 y.o. female who had concerns including Injections of the Right Knee.    Jacqui presents to clinic today for follow up of right knee Synvisc one injection.     Previous HPI: 10/12/22  Jacqui Nobles is a 42 y.o. female, who is athletically active in soccer here for evaluation of her right  Knee. The pain started 20 years ago after pivot/twist mechanism of injury and is becoming progressively worse. Pain is located over (points to) lateral. She reports that the pain is a 3 /10 aching pain today. She reports NSAIDS  with no improvement  Cortisone injection  with no improvement  Therapy  with no improvement  Surgery  with no improvement previous treatments. Pain is affecting ADLs and limiting desired level of activity. Denies numbness, tingling, radiation, and inability to bear weight.  Pain is 7 /10 at its worst    Mechanical symptoms:giving way  Subjective instability: (+)   Worse with activity  Better with rest  Nocturnal symptoms: (+)     Multiple R knee surgeries including microfx, multiple ACL recons, meniscal repairs in the past. Last surgery 6-8 years ago             Review of Systems   Constitutional: Negative for fever and night sweats.   HENT:  Negative for hearing loss.    Eyes:  Negative for blurred vision and visual disturbance.   Cardiovascular:  Negative for chest pain and leg swelling.   Respiratory:  Negative for shortness of breath.    Endocrine: Negative for polyuria.   Hematologic/Lymphatic: Negative for bleeding problem.   Skin:  Negative for rash.   Musculoskeletal:  Positive for joint pain. Negative for back pain, joint swelling, muscle cramps and muscle weakness.   Gastrointestinal:  Negative for melena.   Genitourinary:  Negative for hematuria.   Neurological:  Negative for loss of balance, numbness and paresthesias.   Psychiatric/Behavioral:  Negative for altered mental status.      Pain Related Questions  Over the past 3 days, what was  your average pain during activity? (I.e. running, jogging, walking, climbing stairs, getting dressed, ect.): 9  Over the past 3 days, what was your highest pain level?: 10  Over the past 3 days, what was your lowest pain level? : 0    Other  How many nights a week are you awakened by your affected body part?: 0      Objective:        General: Jacqui is well-developed, well-nourished, appears stated age, in no acute distress, alert and oriented to time, place and person.     General    Vitals reviewed.  Constitutional: She is oriented to person, place, and time. She appears well-developed and well-nourished. No distress.   HENT:   Mouth/Throat: No oropharyngeal exudate.   Eyes: Right eye exhibits no discharge. Left eye exhibits no discharge.   Pulmonary/Chest: Effort normal and breath sounds normal. No respiratory distress.   Neurological: She is alert and oriented to person, place, and time. She has normal reflexes. No cranial nerve deficit. Coordination normal.   Psychiatric: She has a normal mood and affect. Her behavior is normal. Judgment and thought content normal.     General Musculoskeletal Exam   Gait: normal       Right Knee Exam     Inspection   Erythema: absent  Scars: present  Swelling: absent  Effusion: absent  Deformity: absent  Bruising: absent    Tenderness   The patient is tender to palpation of the lateral joint line.    Range of Motion   Extension:  0 normal   Flexion:  140 normal     Tests   Meniscus   Marylin:  Medial - negative Lateral - negative  Ligament Examination   Lachman: abnormal - grade II  PCL-Posterior Drawer: normal (0 to 2mm)     MCL - Valgus: normal (0 to 2mm)  LCL - Varus: normal  Pivot Shift: abnormal- grade II  Reverse Pivot Shift: normal (Equal)  Dial Test at 30 degrees: normal (< 5 degrees)  Dial Test at 90 degrees: normal (< 5 degrees)  Posterior Sag Test: negative  Posterolateral Corner: stable  Patella   Patellar apprehension: negative  Passive Patellar Tilt:  neutral  Patellar Tracking: normal  Patellar Glide (quadrants): Lateral - 1   Medial - 2  Q-Angle at 90 degrees: normal  Patellar Grind: negative  J-Sign: none    Other   Meniscal Cyst: absent  Popliteal (Baker's) Cyst: absent  Sensation: normal    Left Knee Exam     Inspection   Erythema: absent  Scars: present  Swelling: absent  Effusion: absent  Deformity: absent  Bruising: absent    Tenderness   The patient is experiencing no tenderness.     Range of Motion   Extension:  -10   Flexion:  140 normal     Tests   Meniscus   Marylin:  Medial - negative Lateral - negative  Stability   Lachman: abnormal  - grade II  PCL-Posterior Drawer: normal (0 to 2mm)  MCL - Valgus: normal (0 to 2mm)  LCL - Varus: normal (0 to 2mm)  Pivot Shift: abnormal - grade II  Reverse Pivot Shift: normal (Equal)  Dial Test at 30 degrees: normal (< 5 degrees)  Dial Test at 90 degrees: normal (< 5 degrees)  Posterior Sag Test: negative  Posterolateral Corner: stable  Patella   Patellar apprehension: negative  Passive Patellar Tilt: neutral  Patellar Tracking: normal  Patellar Glide (Quadrants): Lateral - 1 Medial - 2  Q-Angle at 90 degrees: normal  Patellar Grind: negative  J-Sign: J sign absent    Other   Meniscal Cyst: absent  Popliteal (Baker's) Cyst: absent  Sensation: normal    Right Hip Exam     Tests   Azck: negative  Left Hip Exam     Tests   Zack: negative          Muscle Strength   Right Lower Extremity   Hip Abduction: 5/5   Quadriceps:  5/5   Hamstrin/5   Left Lower Extremity   Hip Abduction: 5/5   Quadriceps:  5/5   Hamstrin/5     Reflexes     Left Side  Achilles:  2+  Quadriceps:  2+    Right Side   Achilles:  2+  Quadriceps:  2+    Vascular Exam     Right Pulses  Dorsalis Pedis:      2+  Posterior Tibial:      2+        Left Pulses  Dorsalis Pedis:      2+  Posterior Tibial:      2+        Multiple xray view of bilateral knees were reviewed in clinic today. Thus shows severe narrowing of the r knee lateral joint line.  Post op changes of ACL reconstructions are seen bilaterally. Vertically oriented interference screw noted in the R femur         Assessment:       Encounter Diagnoses   Name Primary?    S/P right knee surgery Yes    Chronic pain of both knees     Chondromalacia of left knee     Other old tear of lateral meniscus of right knee     Chondromalacia of right knee     Acquired valgus deformity knee, right     Acquired varus deformity knee, left             Plan:       1. RTC in 2 weeks with Dr. Skylar Palmer for virtual result review MRI and CT. IKDC, SF-12 and KOOS was filled out today in clinic. Patient will fill out IKDC, SF-12 and KOOS on return.    2. Medications: Refills of the following Rx were sent to patients preferred Pharmacy:  No Refills Needed Today    3. Physical Therapy: Continue/Begin: Begin at     4. HEP: N/A    5. Procedures/Procedural Planning:   Given extreme dysfunction and discomfort, and non-diagnostic x-ray imaging, we will obtain MRI w/ T2 mapping and cartilage specific sequencing imaging for further evaluation of the right knee. CT ordered to evaluate lateral tibial plateau slope.     6. DME: Lateral  continue    7. Work/Sport Status: full work duty    8. Visit Summary: Discussed need for right MRI for meniscal and cartilage evaluation. Discussed unicompartmental replacement pressfit vs cemented custom. Will follow up of after CT and MRI to discuss planning                                [facility] patient questionnaires have been collected today.

## 2023-04-06 ENCOUNTER — HOSPITAL ENCOUNTER (OUTPATIENT)
Dept: RADIOLOGY | Facility: HOSPITAL | Age: 43
Discharge: HOME | End: 2023-04-06
Attending: ORTHOPAEDIC SURGERY
Payer: COMMERCIAL

## 2023-04-06 DIAGNOSIS — M94.261 CHONDROMALACIA OF RIGHT KNEE: ICD-10-CM

## 2023-04-06 DIAGNOSIS — M23.200 OTHER OLD TEAR OF LATERAL MENISCUS OF RIGHT KNEE: ICD-10-CM

## 2023-04-06 DIAGNOSIS — M94.262 CHONDROMALACIA OF LEFT KNEE: ICD-10-CM

## 2023-04-06 PROCEDURE — 73721 MRI JNT OF LWR EXTRE W/O DYE: CPT | Mod: TC,RT

## 2023-04-06 PROCEDURE — 73700 CT KNEE WITHOUT CONTRAST RIGHT: ICD-10-PCS | Mod: 26,RT,, | Performed by: RADIOLOGY

## 2023-04-06 PROCEDURE — 73700 CT LOWER EXTREMITY W/O DYE: CPT | Mod: TC,RT

## 2023-04-06 PROCEDURE — 73721 MRI JNT OF LWR EXTRE W/O DYE: CPT | Mod: 26,RT,, | Performed by: RADIOLOGY

## 2023-04-06 PROCEDURE — 73700 CT LOWER EXTREMITY W/O DYE: CPT | Mod: 26,RT,, | Performed by: RADIOLOGY

## 2023-04-06 PROCEDURE — 73721 MRI KNEE WITHOUT CONTRAST RIGHT: ICD-10-PCS | Mod: 26,RT,, | Performed by: RADIOLOGY

## 2023-04-25 ENCOUNTER — PATIENT MESSAGE (OUTPATIENT)
Dept: SPORTS MEDICINE | Facility: CLINIC | Age: 43
End: 2023-04-25

## 2023-04-25 ENCOUNTER — OFFICE VISIT (OUTPATIENT)
Dept: SPORTS MEDICINE | Facility: CLINIC | Age: 43
End: 2023-04-25
Payer: COMMERCIAL

## 2023-04-25 DIAGNOSIS — M94.261 CHONDROMALACIA OF RIGHT KNEE: Primary | ICD-10-CM

## 2023-04-25 DIAGNOSIS — M17.11 PRIMARY OSTEOARTHRITIS OF RIGHT KNEE: ICD-10-CM

## 2023-04-25 PROCEDURE — 1160F RVW MEDS BY RX/DR IN RCRD: CPT | Mod: CPTII,95,, | Performed by: PHYSICIAN ASSISTANT

## 2023-04-25 PROCEDURE — 1159F MED LIST DOCD IN RCRD: CPT | Mod: CPTII,95,, | Performed by: PHYSICIAN ASSISTANT

## 2023-04-25 PROCEDURE — 99215 PR OFFICE/OUTPT VISIT, EST, LEVL V, 40-54 MIN: ICD-10-PCS | Mod: 95,,, | Performed by: PHYSICIAN ASSISTANT

## 2023-04-25 PROCEDURE — 1160F PR REVIEW ALL MEDS BY PRESCRIBER/CLIN PHARMACIST DOCUMENTED: ICD-10-PCS | Mod: CPTII,95,, | Performed by: PHYSICIAN ASSISTANT

## 2023-04-25 PROCEDURE — 1159F PR MEDICATION LIST DOCUMENTED IN MEDICAL RECORD: ICD-10-PCS | Mod: CPTII,95,, | Performed by: PHYSICIAN ASSISTANT

## 2023-04-25 PROCEDURE — 99215 OFFICE O/P EST HI 40 MIN: CPT | Mod: 95,,, | Performed by: PHYSICIAN ASSISTANT

## 2023-04-25 NOTE — PROGRESS NOTES
Telemedicine/Virtual Visit Documentation:     The patient location is: home- MRI AND CT scan results review    The chief complaint leading to consultation is: see HPI from 10/12/22  Jacqui Nobles is a 42 y.o. female, who is athletically active in soccer here for evaluation of her right  Knee. The pain started 20 years ago after pivot/twist mechanism of injury and is becoming progressively worse. Pain is located over (points to) lateral. She reports that the pain is a 3 /10 aching pain today. She reports NSAIDS  with no improvement  Cortisone injection  with no improvement  Therapy  with no improvement  Surgery  with no improvement previous treatments. Pain is affecting ADLs and limiting desired level of activity. Denies numbness, tingling, radiation, and inability to bear weight.  Pain is 7 /10 at its worst    VISIT TYPE X   Virtual visit with synchronous audio and video X   Telephone E/M service      Total time spent with patient: see X lilian on chart below.   More than half of the time was spent counseling or coordinating care including prognosis, differential diagnosis, risks and benefits of treatment, instructions, compliance risk reductions     EST MINUTES X   [id number] 5    27580 10    [id number] 15    [id number] 25    15809 40 X   NEW     55154 10    79061 20    59873 30    78743 45    80907 60    PHONE      5-10    60425 11-20    03594 21-30      MRI right knee: 4/6/23  FINDINGS:  Menisci:  Markedly diminutive medial meniscus in keeping with prior meniscectomy.  Horizontal tear within the body segment lateral meniscus along with free edge irregularity of the body segment and posterior horn, suspicious for tear.     Ligaments:  Postsurgical change of ACL reconstruction.  Graft appears somewhat attenuated with productive change at the lateral femoral notch.  No evidence for complete tear.  Osseous tunnels appear unremarkable.  PCL, MCL, and LCL complex are intact.  Note made of ossific body between the proximal MCL and  medial femoral condyle.     Tendons: Postsurgical changes of the patellar tendon in compatible with graft harvest.  Quadriceps tendon is unremarkable.     Cartilage:     Patellofemoral: Articular cartilage is maintained.     Medial tibiofemoral: Articular cartilage is maintained.     Lateral tibiofemoral: Full-thickness cartilage loss over the posterior weight-bearing to far posterior femoral condyle spanning approximately 2.5 x 1.2 cm with intra-articular osteophyte production.  Full-thickness cartilage fissuring noted over the opposing tibial plateau.  Associated subchondral edema noted.     Bone: No fracture or marrow replacing process.     Miscellaneous: There is no joint effusion.     Examination mildly degraded by patient motion artifact.    CT right knee: 4/6/23  FINDINGS:  No fracture or dislocation.  No lytic or blastic lesion.     Status post ACL reconstruction.  Graft appears somewhat attenuated, noting productive changes at the lateral aspect of the notch.  Femoral and tibial tunnels appear unremarkable, measuring less than 1 cm in diameter.     Ossific body noted at the medial aspect of the medial femoral condyle.     Postoperative changes are seen within the patellar tendon in keeping with prior graft harvesting.     There is tibiofemoral osteophyte production with cartilage space narrowing.  Lateral compartment subchondral sclerosis and cystic change noted.     There is no significant joint effusion.     Muscle bulk is maintained.    Assessment:  Right knee chondromalacia  Right knee primary osteoarthritis    Plan:  I made the decision to obtain old records of the patient including previous notes and imaging. New imaging was ordered today of the extremity or extremities evaluated. I independently reviewed and interpreted the radiographs and/or MRIs today as well as prior imaging. Reviewed MRI and CT scan with patient in detail.    Dr. Palmer reviewed the images and recommended the following below  ":"Discussed numerous options with patient. Long leg films 2 degrees right knee valgus vs. Neutral left. No lateral or medial meniscus and large lateral femoral chondral lesion with kissing lesion of the lateral tibial plateau. There does appear to be lateral tibial posterior slope that is significant as well. I originally recommended lateral unicompartmental and feel this is still the most effective treatment but let's set up an in person discussion in May 15, 2023. Can do PT and conservative management until that time.     Alternative:  Lateral Bio Unicompartmental with LFC and Tibial MOPS grafts/Meniscus  Would need to evaluate best correction of tibial slope and valgus to right knee.  Can this CT be sent for [facility] Evaluation? If not, repeat CT to send to [facility] to assess the Lateral tibia and femur on the right."    3. After extensive discussion, patient is most interested in a pressfit option for lateral unicompartment replacement    4. Referral placed for [facility] for patellofemoral and quad strengthening with     5. RTC in 1 month with Dr. Skylar Palmer for further discussion of surgical treatment options.     "

## 2023-05-15 ENCOUNTER — CLINICAL SUPPORT (OUTPATIENT)
Dept: REHABILITATION | Facility: HOSPITAL | Age: 43
End: 2023-05-15
Payer: COMMERCIAL

## 2023-05-15 DIAGNOSIS — M94.261 CHONDROMALACIA OF RIGHT KNEE: ICD-10-CM

## 2023-05-15 DIAGNOSIS — R29.898 WEAKNESS OF BOTH HIPS: ICD-10-CM

## 2023-05-15 DIAGNOSIS — M17.11 PRIMARY OSTEOARTHRITIS OF RIGHT KNEE: ICD-10-CM

## 2023-05-15 DIAGNOSIS — M25.652 DECREASED RANGE OF MOTION OF BOTH HIPS: ICD-10-CM

## 2023-05-15 DIAGNOSIS — M25.651 DECREASED RANGE OF MOTION OF BOTH HIPS: ICD-10-CM

## 2023-05-15 PROCEDURE — 97161 PT EVAL LOW COMPLEX 20 MIN: CPT | Performed by: PHYSICAL THERAPIST

## 2023-05-15 PROCEDURE — 97110 THERAPEUTIC EXERCISES: CPT | Performed by: PHYSICAL THERAPIST

## 2023-05-19 PROBLEM — M25.659 DECREASED RANGE OF MOTION OF HIP: Status: ACTIVE | Noted: 2023-05-19

## 2023-05-19 PROBLEM — R29.898 WEAKNESS OF BOTH HIPS: Status: ACTIVE | Noted: 2023-05-19

## 2023-05-19 NOTE — PLAN OF CARE
[facility]  Physical Therapy Initial Evaluation    Date: 5/15/2023   Name: Jacqui Nobles  Clinic Number: [id number]    Therapy Diagnosis:   Encounter Diagnoses   Name Primary?    Chondromalacia of right knee     Primary osteoarthritis of right knee     Weakness of both hips     Decreased range of motion of both hips      Physician: Chanel Olmos PA-C    Physician Orders: PT Eval and Treat   Medical Diagnosis from Referral: Chondromalacia of right knee [M94.261], Primary osteoarthritis of right knee [M17.11]  Evaluation Date: 5/15/2023  Authorization Period Expiration:   Plan of Care Expiration: 4/24/2024  Visit # / Visits authorized: 1/ Eval    Time In: 3:00 pm  Time Out: 5:00 pm  Total Appointment Time (timed & untimed codes): 60 minutes    Precautions: Standard    Subjective   Date of onset: Chronic  History of current condition - Jacqui reports: PMH of multiple knee surgeries with persistent chronic pain affecting her ability to perform fitness activities, ADLs/IADLs. Pt reports increased pain with WB activities that include knee flexion. Pt previously played soccer and currently participates in pole fitness activities. Pt is considering pursuing surgery and is here for conservative management until follow-up with physician.      Medical History:   Past Medical History:   Diagnosis Date    Asthma        Surgical History:   Jacqui Nobles  has a past surgical history that includes Anterior cruciate ligament repair (Bilateral); microfracture knee (Right); and Anterior cruciate ligament repair (Left).    Medications:   Jacqui currently has no medications in their medication list.    Allergies:   Review of patient's allergies indicates:   Allergen Reactions    Ceftin [cefuroxime axetil] Other (See Comments)     Bloody stool        Imaging:    EXAMINATION:  MRI KNEE WITHOUT CONTRAST RIGHT     CLINICAL HISTORY:  Knee pain, chronic, positive xray (Age >= 5y);right knee lateral  wear;Chondromalacia, right knee     TECHNIQUE:  Multiplanar, multisequence MRI of the right knee performed without contrast.  T2 cartilage mapping series were obtained for preoperative evaluation.     COMPARISON:  Knee radiograph 10/12/2022     FINDINGS:  Menisci:  Markedly diminutive medial meniscus in keeping with prior meniscectomy.  Horizontal tear within the body segment lateral meniscus along with free edge irregularity of the body segment and posterior horn, suspicious for tear.     Ligaments:  Postsurgical change of ACL reconstruction.  Graft appears somewhat attenuated with productive change at the lateral femoral notch.  No evidence for complete tear.  Osseous tunnels appear unremarkable.  PCL, MCL, and LCL complex are intact.  Note made of ossific body between the proximal MCL and medial femoral condyle.     Tendons: Postsurgical changes of the patellar tendon in compatible with graft harvest.  Quadriceps tendon is unremarkable.     Cartilage:     Patellofemoral: Articular cartilage is maintained.     Medial tibiofemoral: Articular cartilage is maintained.     Lateral tibiofemoral: Full-thickness cartilage loss over the posterior weight-bearing to far posterior femoral condyle spanning approximately 2.5 x 1.2 cm with intra-articular osteophyte production.  Full-thickness cartilage fissuring noted over the opposing tibial plateau.  Associated subchondral edema noted.     Bone: No fracture or marrow replacing process.     Miscellaneous: There is no joint effusion.     Examination mildly degraded by patient motion artifact.     Impression:     1. Postsurgical change of ACL reconstruction.  Graft appears somewhat attenuated with productive change at the lateral femoral notch.  No evidence for complete tear.  Osseous tunnels appear unremarkable.  2. Postsurgical change of prior medial meniscectomy.  Horizontal tear of lateral meniscus body segment along with free edge fraying of the body segment and posterior  horn.  3. Full-thickness lateral compartment cartilage loss with subchondral marrow edema.     Electronically signed by resident: Larry Hazel  Date:                                            04/06/2023  Time:                                           09:28     Electronically signed by: Tim Quinteros MD  Date:                                            04/06/2023  Time:                                           11:20    CT KNEE WITHOUT CONTRAST RIGHT     CLINICAL HISTORY:  Knee pain, chronic, positive xray (Age >= 5y);  Chondromalacia, left knee     TECHNIQUE:  CT right knee performed without contrast.  Coronal and sagittal reformats provided.     COMPARISON:  MRI 04/06/2023     FINDINGS:  No fracture or dislocation.  No lytic or blastic lesion.     Status post ACL reconstruction.  Graft appears somewhat attenuated, noting productive changes at the lateral aspect of the notch.  Femoral and tibial tunnels appear unremarkable, measuring less than 1 cm in diameter.     Ossific body noted at the medial aspect of the medial femoral condyle.     Postoperative changes are seen within the patellar tendon in keeping with prior graft harvesting.     There is tibiofemoral osteophyte production with cartilage space narrowing.  Lateral compartment subchondral sclerosis and cystic change noted.     There is no significant joint effusion.     Muscle bulk is maintained.     Impression:     1. Postoperative changes of ACL reconstruction.  Graft appears somewhat attenuated, noting productive changes at the lateral aspect of the notch.  Tibial and femoral tunnels are unremarkable.  2. Tibiofemoral cartilage space narrowing with osteophyte production.  Lateral compartment subchondral sclerosis and cystic change.  3. Additional findings above.        Electronically signed by: Tim Quinteros MD  Date:                                            04/06/2023  Time:                                           09:39    Prior Therapy: For  previous surgeries  Social History: Lives alone   Occupation: [employer]  Prior Level of Function: Independent with ADLs/IADLs and recreational and fitness activities  Current Level of Function: Limited with ADLs/IADLs and recreational and fitness activities    Pain:  Current 0/10, worst 6/10, best 0/10   Location: right knee  Description: Sharp, Dull  Aggravating Factors: Bending and Squatting  Easing Factors: rest    Patients goals: To improve functional activities until surgery consult    Objective   Gait:   Slight extention varus on R LE    Observation:   (B) knee valgus   (B) tibial ER      Range of Motion:   Knee Left active Left Passive Right Active R passive   Flexion 135 140 130 135 *painful   Extension 5 15 3 5       Joint Mobility:  (R ) patellar hypomobility      Lower Extremity Strength    Quad Set:  Able to perform  SLR: Able to perform    Right LE  Left LE    Knee extension: 4+/5 *pain Knee extension: 5/5   Knee flexion: 4+/5 Knee flexion: 5/5   Hip flexion: 4/5 Hip flexion: 4+/5   Hip extension:  4+/5 Hip extension: 4+/5   PGM: 3/5 *hip flexion PGM:  3/5          Balance Assessment:       Evaluation   Single Limb Stance R LE 20s  (<10 sec = HIGH FALL RISK)   Single Limb Stance L LE 20s  (<10 sec = HIGH FALL RISK)        Functional Testing:     Squat - Excessive forward lean with hip MR and knee valgus with R > L    Single leg squat - Hip MR with knee valgus R > L    Prone hip ext - HS > glut > lumbar B    Palpation:  TTP+ = tender to palpation   TTT++ = very tender to touch   (R ) lateral joint line       Sensation: WNL    Flexibility:  WNL  Hypermobility of HS B     Edema: WNL      Limitation/Restriction for FOTO Knee Survey    Therapist reviewed FOTO scores for Jacqui Nobles on 5/15/2023.   FOTO documents entered into [employer] - see Media section.    Limitation Score: 44%         TREATMENT   Treatment Time In: 3:00 pm  Treatment Time Out: 5:00 pm  Total Treatment time (time-based codes) separate from  Evaluation: 30 minutes    Neuromuscular re-education activities to improve: Balance, Coordination, Kinesthetic, Sense, and Proprioception for 30 minutes. The following activities were included:  Lateral heel taps attempted  Wobble board attempted with mini iso squat  Squat isometrics (pt education on preventing hip MR w/ knee valgus and tibial ER)    Air squats  Prone hip ext with emphasis on glut > HS sequencing         Home Exercises and Patient Education Provided    Education provided:   - HEP  - POC/prognosis    Written Home Exercises Provided: yes.  Exercises were reviewed and Jacqui was able to demonstrate them prior to the end of the session.  Jacqui demonstrated good  understanding of the education provided.     See EMR under Patient Instructions for exercises provided 5/15/2023.    Assessment   Jacqui is a 42 y.o. female referred to outpatient Physical Therapy with a medical diagnosis of Chondromalacia of right knee [M94.261], Primary osteoarthritis of right knee [M17.11]. Patient presents with painful and limited knee ROM with impaired functional NM control of knee with limitations in squatting activities with limited ability to participate in fitness and recreational activities.      Patient prognosis is Guarded.   Patientt will benefit from skilled outpatient Physical Therapy to address the deficits stated above and in the chart below, provide patient /family education, and to maximize patientt's level of independence.     Plan of care discussed with patient: Yes  Patient's spiritual, cultural and educational needs considered and patient is agreeable to the plan of care and goals as stated below:     Anticipated Barriers for therapy: Chronicity of sx, PMH multiple surgeries    Medical Necessity is demonstrated by the following  History  Co-morbidities and personal factors that may impact the plan of care Co-morbidities:   Prior knee surgeries    Personal Factors:   lifestyle     low   Examination  Body  Structures and Functions, activity limitations and participation restrictions that may impact the plan of care Body Regions:   lower extremities    Body Systems:    gross symmetry  ROM  strength  gross coordinated movement  transfers  transitions  motor control    Participation Restrictions:   Fitness and recreational activities    Activity limitations:   Learning and applying knowledge  no deficits    General Tasks and Commands  no deficits    Communication  no deficits    Mobility  lifting and carrying objects    Self care  no deficits    Domestic Life  no deficits    Interactions/Relationships  no deficits    Life Areas  no deficits    Community and Social Life  recreation and leisure         moderate   Clinical Presentation stable and uncomplicated low   Decision Making/ Complexity Score: low     Goals:  Short Term Goals: (4-6 weeks)  1. Pt will be independent with HEP in order to supplement patient in improving functional mobility. - progressing, not met  2. Pt will improve (R ) knee AROM to at least 5-135 in order to improve gait. - progressing, not met  3. Pt will improve hip abduction strength from 3/5 to 4/5 to improve functional gait deviation. - progressing, not met  4. Pt will demonstrate appropriate muscle activation sequencing during prone hip ext to improve function. - progressing, not met     Long Term Goals: (10-12 weeks)  1. Pt will be independent with updated HEP supplement PT in improving functional mobility. - progressing, not met  2. Pt will improve FOTO knee survey score to </= 70 % limited in order to demo improved functional mobility. - progressing, not met  3. Pt will perform at least 10 squats with uncompensated mechanics to improve functional mobility - progressing, not met    Plan   Plan of care Certification: 5/15/2023 to 12/31/2023.    Outpatient Physical Therapy 2 times weekly for 12 weeks to include the following interventions: Gait Training, Manual Therapy, Moist Heat/ Ice,  Neuromuscular Re-ed, Patient Education, Therapeutic Activities, and Therapeutic Exercise.     Frankie Amaro, PT, DPT  Saeed Morataya, PT, DPT, SCS

## 2023-05-19 NOTE — PROGRESS NOTES
Subjective:          Chief Complaint: Jacqui Nobles is a 42 y.o. female who had concerns including Pain of the Right Knee.    Patient comes to clinic for follow up of right knee pain. She received CT and MRI    Previous HPI: 10/12/22  Jacqui Nobles is a 42 y.o. female, who is athletically active in soccer here for evaluation of her right  Knee. The pain started 20 years ago after pivot/twist mechanism of injury and is becoming progressively worse. Pain is located over (points to) lateral. She reports that the pain is a 3 /10 aching pain today. She reports NSAIDS  with no improvement  Cortisone injection  with no improvement  Therapy  with no improvement  Surgery  with no improvement previous treatments. Pain is affecting ADLs and limiting desired level of activity. Denies numbness, tingling, radiation, and inability to bear weight.  Pain is 7 /10 at its worst    Mechanical symptoms:giving way  Subjective instability: (+)   Worse with activity  Better with rest  Nocturnal symptoms: (+)     Multiple R knee surgeries including microfx, multiple ACL recons, meniscal repairs in the past. Last surgery 6-8 years ago             Review of Systems   Constitutional: Negative for fever and night sweats.   HENT:  Negative for hearing loss.    Eyes:  Negative for blurred vision and visual disturbance.   Cardiovascular:  Negative for chest pain and leg swelling.   Respiratory:  Negative for shortness of breath.    Endocrine: Negative for polyuria.   Hematologic/Lymphatic: Negative for bleeding problem.   Skin:  Negative for rash.   Musculoskeletal:  Positive for joint pain. Negative for back pain, joint swelling, muscle cramps and muscle weakness.   Gastrointestinal:  Negative for melena.   Genitourinary:  Negative for hematuria.   Neurological:  Negative for loss of balance, numbness and paresthesias.   Psychiatric/Behavioral:  Negative for altered mental status.                  Objective:        General: Jacqui ruiz  well-developed, well-nourished, appears stated age, in no acute distress, alert and oriented to time, place and person.     General    Vitals reviewed.  Constitutional: She is oriented to person, place, and time. She appears well-developed and well-nourished. No distress.   HENT:   Mouth/Throat: No oropharyngeal exudate.   Eyes: Right eye exhibits no discharge. Left eye exhibits no discharge.   Pulmonary/Chest: Effort normal and breath sounds normal. No respiratory distress.   Neurological: She is alert and oriented to person, place, and time. She has normal reflexes. No cranial nerve deficit. Coordination normal.   Psychiatric: She has a normal mood and affect. Her behavior is normal. Judgment and thought content normal.     General Musculoskeletal Exam   Gait: normal       Right Knee Exam     Inspection   Erythema: absent  Scars: present  Swelling: absent  Effusion: absent  Deformity: absent  Bruising: absent    Tenderness   The patient is tender to palpation of the lateral joint line.    Range of Motion   Extension:  0 normal   Flexion:  140 normal     Tests   Meniscus   Marylin:  Medial - negative Lateral - negative  Ligament Examination   Lachman: abnormal - grade II  PCL-Posterior Drawer: normal (0 to 2mm)     MCL - Valgus: normal (0 to 2mm)  LCL - Varus: normal  Pivot Shift: abnormal- grade II  Reverse Pivot Shift: normal (Equal)  Dial Test at 30 degrees: normal (< 5 degrees)  Dial Test at 90 degrees: normal (< 5 degrees)  Posterior Sag Test: negative  Posterolateral Corner: stable  Patella   Patellar apprehension: negative  Passive Patellar Tilt: neutral  Patellar Tracking: normal  Patellar Glide (quadrants): Lateral - 1   Medial - 2  Q-Angle at 90 degrees: normal  Patellar Grind: negative  J-Sign: none    Other   Meniscal Cyst: absent  Popliteal (Baker's) Cyst: absent  Sensation: normal    Left Knee Exam     Inspection   Erythema: absent  Scars: present  Swelling: absent  Effusion: absent  Deformity:  absent  Bruising: absent    Tenderness   The patient is experiencing no tenderness.     Range of Motion   Extension:  -10   Flexion:  140 normal     Tests   Meniscus   Marylin:  Medial - negative Lateral - negative  Stability   Lachman: abnormal  - grade II  PCL-Posterior Drawer: normal (0 to 2mm)  MCL - Valgus: normal (0 to 2mm)  LCL - Varus: normal (0 to 2mm)  Pivot Shift: abnormal - grade II  Reverse Pivot Shift: normal (Equal)  Dial Test at 30 degrees: normal (< 5 degrees)  Dial Test at 90 degrees: normal (< 5 degrees)  Posterior Sag Test: negative  Posterolateral Corner: stable  Patella   Patellar apprehension: negative  Passive Patellar Tilt: neutral  Patellar Tracking: normal  Patellar Glide (Quadrants): Lateral - 1 Medial - 2  Q-Angle at 90 degrees: normal  Patellar Grind: negative  J-Sign: J sign absent    Other   Meniscal Cyst: absent  Popliteal (Baker's) Cyst: absent  Sensation: normal    Right Hip Exam     Tests   Zack: negative  Left Hip Exam     Tests   Zack: negative          Muscle Strength   Right Lower Extremity   Hip Abduction: 5/5   Quadriceps:  5/5   Hamstrin/5   Left Lower Extremity   Hip Abduction: 5/5   Quadriceps:  5/5   Hamstrin/5     Reflexes     Left Side  Achilles:  2+  Quadriceps:  2+    Right Side   Achilles:  2+  Quadriceps:  2+    Vascular Exam     Right Pulses  Dorsalis Pedis:      2+  Posterior Tibial:      2+        Left Pulses  Dorsalis Pedis:      2+  Posterior Tibial:      2+        Multiple xray view of bilateral knees were reviewed in clinic today. Thus shows severe narrowing of the r knee lateral joint line. Post op changes of ACL reconstructions are seen bilaterally. Vertically oriented interference screw noted in the R femur         Assessment:       Encounter Diagnoses   Name Primary?    S/P right knee surgery Yes    Chronic pain of both knees     Arthritis of right knee     Acquired genu valgum of right knee               Plan:       1. RTC in 2 weeks with   Skylar Palmer. IKDC, SF-12 and KOOS was filled out today in clinic. Patient will fill out IKDC, SF-12 and KOOS on return.    2. Medications: Refills of the following Rx were sent to patients preferred Pharmacy:  No Refills Needed Today    3. Physical Therapy: Begin at [facility] with     4. HEP: N/A    5. Procedures/Procedural Planning:   right Knee:    58754 Arthroscopy, debridement/shaving of articular cartilage (Chondroplasty  50947 Arthroscopy, with meniscectomy (medial AND lateral)  66824 Arthroscopy, knee, synovectomy, limited  68081 Arthroscopy, with lysis of adhesions    Clearance Medically Necessary: No       Pre-Op Center Clearance Medically Necessary: No        6. DME: Lateral     7. Work/Sport Status: Return to work/sport as tolerated    8. Visit Summary: Patient needs the above procedures, she is going to proceed on June 13.                                     [facility] patient questionnaires have been collected today.

## 2023-05-24 ENCOUNTER — OFFICE VISIT (OUTPATIENT)
Dept: SPORTS MEDICINE | Facility: CLINIC | Age: 43
End: 2023-05-24
Payer: COMMERCIAL

## 2023-05-24 VITALS
HEIGHT: 66 IN | HEART RATE: 62 BPM | DIASTOLIC BLOOD PRESSURE: 71 MMHG | SYSTOLIC BLOOD PRESSURE: 119 MMHG | BODY MASS INDEX: 25.71 KG/M2 | WEIGHT: 160 LBS

## 2023-05-24 DIAGNOSIS — M25.561 CHRONIC PAIN OF BOTH KNEES: ICD-10-CM

## 2023-05-24 DIAGNOSIS — Z98.890 S/P RIGHT KNEE SURGERY: Primary | ICD-10-CM

## 2023-05-24 DIAGNOSIS — M17.11 ARTHRITIS OF RIGHT KNEE: ICD-10-CM

## 2023-05-24 DIAGNOSIS — M21.061 ACQUIRED GENU VALGUM OF RIGHT KNEE: ICD-10-CM

## 2023-05-24 DIAGNOSIS — M25.562 CHRONIC PAIN OF BOTH KNEES: ICD-10-CM

## 2023-05-24 DIAGNOSIS — G89.29 CHRONIC PAIN OF BOTH KNEES: ICD-10-CM

## 2023-05-24 PROCEDURE — 1159F MED LIST DOCD IN RCRD: CPT | Mod: CPTII,S$GLB,, | Performed by: ORTHOPAEDIC SURGERY

## 2023-05-24 PROCEDURE — 1160F PR REVIEW ALL MEDS BY PRESCRIBER/CLIN PHARMACIST DOCUMENTED: ICD-10-PCS | Mod: CPTII,S$GLB,, | Performed by: ORTHOPAEDIC SURGERY

## 2023-05-24 PROCEDURE — 3074F SYST BP LT 130 MM HG: CPT | Mod: CPTII,S$GLB,, | Performed by: ORTHOPAEDIC SURGERY

## 2023-05-24 PROCEDURE — 3008F PR BODY MASS INDEX (BMI) DOCUMENTED: ICD-10-PCS | Mod: CPTII,S$GLB,, | Performed by: ORTHOPAEDIC SURGERY

## 2023-05-24 PROCEDURE — 1160F RVW MEDS BY RX/DR IN RCRD: CPT | Mod: CPTII,S$GLB,, | Performed by: ORTHOPAEDIC SURGERY

## 2023-05-24 PROCEDURE — 3074F PR MOST RECENT SYSTOLIC BLOOD PRESSURE < 130 MM HG: ICD-10-PCS | Mod: CPTII,S$GLB,, | Performed by: ORTHOPAEDIC SURGERY

## 2023-05-24 PROCEDURE — 3078F PR MOST RECENT DIASTOLIC BLOOD PRESSURE < 80 MM HG: ICD-10-PCS | Mod: CPTII,S$GLB,, | Performed by: ORTHOPAEDIC SURGERY

## 2023-05-24 PROCEDURE — 99214 OFFICE O/P EST MOD 30 MIN: CPT | Mod: S$GLB,,, | Performed by: ORTHOPAEDIC SURGERY

## 2023-05-24 PROCEDURE — 3078F DIAST BP <80 MM HG: CPT | Mod: CPTII,S$GLB,, | Performed by: ORTHOPAEDIC SURGERY

## 2023-05-24 PROCEDURE — 1159F PR MEDICATION LIST DOCUMENTED IN MEDICAL RECORD: ICD-10-PCS | Mod: CPTII,S$GLB,, | Performed by: ORTHOPAEDIC SURGERY

## 2023-05-24 PROCEDURE — 3008F BODY MASS INDEX DOCD: CPT | Mod: CPTII,S$GLB,, | Performed by: ORTHOPAEDIC SURGERY

## 2023-05-24 PROCEDURE — 99214 PR OFFICE/OUTPT VISIT, EST, LEVL IV, 30-39 MIN: ICD-10-PCS | Mod: S$GLB,,, | Performed by: ORTHOPAEDIC SURGERY

## 2023-05-24 PROCEDURE — 99999 PR PBB SHADOW E&M-EST. PATIENT-LVL III: ICD-10-PCS | Mod: PBBFAC,,, | Performed by: ORTHOPAEDIC SURGERY

## 2023-05-24 PROCEDURE — 99999 PR PBB SHADOW E&M-EST. PATIENT-LVL III: CPT | Mod: PBBFAC,,, | Performed by: ORTHOPAEDIC SURGERY

## 2023-05-31 DIAGNOSIS — M23.203 OLD TEAR OF MEDIAL MENISCUS OF RIGHT KNEE, UNSPECIFIED TEAR TYPE: ICD-10-CM

## 2023-05-31 DIAGNOSIS — M94.261 CHONDROMALACIA OF RIGHT KNEE: Primary | ICD-10-CM

## 2023-05-31 DIAGNOSIS — M23.200 OLD TEAR OF LATERAL MENISCUS OF RIGHT KNEE, UNSPECIFIED TEAR TYPE: ICD-10-CM

## 2023-05-31 DIAGNOSIS — M23.91 INTERNAL DERANGEMENT OF RIGHT KNEE: ICD-10-CM

## 2023-06-01 ENCOUNTER — PATIENT MESSAGE (OUTPATIENT)
Dept: PREADMISSION TESTING | Facility: HOSPITAL | Age: 43
End: 2023-06-01
Payer: COMMERCIAL

## 2023-06-01 NOTE — ANESTHESIA PAT ROS NOTE
06/01/2023  Jacqui Nobles is a 42 y.o., female.      Pre-op Assessment    I have reviewed the Patient Summary Reports.       I have reviewed the Medications.     Review of Systems  Anesthesia Hx:  No problems with previous Anesthesia               Denies Personal Hx of Anesthesia complications.                    Social:  Non-Smoker       Hematology/Oncology:  Hematology Normal   Oncology Normal                                   EENT/Dental:  EENT/Dental Normal           Cardiovascular:  Cardiovascular Normal Exercise tolerance: good       Denies CAD.     Denies Dysrhythmias.         Denies LAZO.                            Pulmonary:    Asthma asymptomatic  Denies Shortness of breath.                  Renal/:  Renal/ Normal  Denies Chronic Renal Disease.                Hepatic/GI:  Hepatic/GI Normal     Denies GERD. Denies Liver Disease.            Musculoskeletal:  Arthritis   Chondromalacia of right knee,  Old tear of lateral meniscus of right knee,   Old tear of medial meniscus of right knee,   Internal derangement of right knee,  H/O ACL Reconstructions left and right knees,  Chronic pain of both knees,  Arthritis of right knee,  Acquired genu valgum of right knee,  Weakness of both hips,  Decreased range of motion of hip                Neurological:  Neurology Normal      Denies Headaches. Denies Seizures.                                Endocrine:  Endocrine Normal Denies Diabetes. Denies Hypothyroidism.          Psych:  Psychiatric Normal                   Past Medical History:   Diagnosis Date    Asthma      Past Surgical History:   Procedure Laterality Date    ANTERIOR CRUCIATE LIGAMENT REPAIR Bilateral     ANTERIOR CRUCIATE LIGAMENT REPAIR Left     microfracture knee Right        Anesthesia Assessment: Preoperative EQUATION    Planned Procedure: Procedure(s) (LRB):  ARTHROSCOPY, KNEE, WITH  CHONDROPLASTY (Right)  ARTHROSCOPY, KNEE, WITH MENISCECTOMY (Right)  SYNOVECTOMY, KNEE, LIMITED (Right)  ARTHROSCOPY, KNEE, LYSIS OF ADHESIONS (Right)  Requested Anesthesia Type:General  Surgeon: Skylar Palmer MD  Service: Orthopedics  Known or anticipated Date of Surgery:6/13/2023    Surgeon notes: reviewed    Electronic QUestionnaire Assessment completed via nurse interview with patient.        Triage considerations:     The patient has no apparent active cardiac condition (No unstable coronary Syndrome such as severe unstable angina or recent [<1 month] myocardial infarction, decompensated CHF, severe valvular   disease or significant arrhythmia)    Previous anesthesia records:None and Not available    Last PCP note: outside [facility]               Instructions given. (See in Nurse's note)      Ht: 5'6  Wt: 160 lb  BMI: 25.82  Vaccinated

## 2023-06-05 ENCOUNTER — OFFICE VISIT (OUTPATIENT)
Dept: SPORTS MEDICINE | Facility: CLINIC | Age: 43
End: 2023-06-05
Payer: COMMERCIAL

## 2023-06-05 VITALS
HEIGHT: 66 IN | BODY MASS INDEX: 25.88 KG/M2 | SYSTOLIC BLOOD PRESSURE: 123 MMHG | WEIGHT: 161 LBS | HEART RATE: 65 BPM | DIASTOLIC BLOOD PRESSURE: 82 MMHG

## 2023-06-05 DIAGNOSIS — M94.261 CHONDROMALACIA OF RIGHT KNEE: Primary | ICD-10-CM

## 2023-06-05 DIAGNOSIS — S83.281D OTHER TEAR OF LATERAL MENISCUS OF RIGHT KNEE AS CURRENT INJURY, SUBSEQUENT ENCOUNTER: ICD-10-CM

## 2023-06-05 DIAGNOSIS — S83.241D OTHER TEAR OF MEDIAL MENISCUS OF RIGHT KNEE AS CURRENT INJURY, SUBSEQUENT ENCOUNTER: ICD-10-CM

## 2023-06-05 PROCEDURE — 99999 PR PBB SHADOW E&M-EST. PATIENT-LVL III: CPT | Mod: PBBFAC,,, | Performed by: PHYSICIAN ASSISTANT

## 2023-06-05 PROCEDURE — 99999 PR PBB SHADOW E&M-EST. PATIENT-LVL III: ICD-10-PCS | Mod: PBBFAC,,, | Performed by: PHYSICIAN ASSISTANT

## 2023-06-05 PROCEDURE — 99499 UNLISTED E&M SERVICE: CPT | Mod: S$GLB,,, | Performed by: PHYSICIAN ASSISTANT

## 2023-06-05 PROCEDURE — 99499 NO LOS: ICD-10-PCS | Mod: S$GLB,,, | Performed by: PHYSICIAN ASSISTANT

## 2023-06-05 RX ORDER — HYDROCODONE BITARTRATE AND ACETAMINOPHEN 5; 325 MG/1; MG/1
1 TABLET ORAL EVERY 8 HOURS PRN
Qty: 21 TABLET | Refills: 0 | Status: SHIPPED | OUTPATIENT
Start: 2023-06-05

## 2023-06-05 RX ORDER — ROPIVACAINE/EPI/CLONIDINE/KET 2.46-0.005
SYRINGE (ML) INJECTION ONCE
Status: CANCELLED | OUTPATIENT
Start: 2023-06-05 | End: 2023-06-05

## 2023-06-05 RX ORDER — METHOCARBAMOL 500 MG/1
500 TABLET, FILM COATED ORAL 3 TIMES DAILY
Qty: 30 TABLET | Refills: 0 | Status: SHIPPED | OUTPATIENT
Start: 2023-06-05 | End: 2023-06-23

## 2023-06-05 RX ORDER — SODIUM CHLORIDE 9 MG/ML
INJECTION, SOLUTION INTRAVENOUS CONTINUOUS
Status: CANCELLED | OUTPATIENT
Start: 2023-06-05

## 2023-06-05 RX ORDER — PROMETHAZINE HYDROCHLORIDE 25 MG/1
25 TABLET ORAL EVERY 6 HOURS PRN
Qty: 30 TABLET | Refills: 0 | Status: SHIPPED | OUTPATIENT
Start: 2023-06-05 | End: 2023-07-05

## 2023-06-05 RX ORDER — CELECOXIB 200 MG/1
200 CAPSULE ORAL 2 TIMES DAILY
Qty: 60 CAPSULE | Refills: 2 | Status: SHIPPED | OUTPATIENT
Start: 2023-06-05

## 2023-06-05 RX ORDER — ASPIRIN 325 MG
325 TABLET, DELAYED RELEASE (ENTERIC COATED) ORAL DAILY
Qty: 42 TABLET | Refills: 0 | Status: SHIPPED | OUTPATIENT
Start: 2023-06-05 | End: 2023-07-25

## 2023-06-05 NOTE — H&P (VIEW-ONLY)
Jacqui Nobles  is here for a completion of her perioperative paperwork. she  Is scheduled to undergo right Knee:     88640 Arthroscopy, debridement/shaving of articular cartilage (Chondroplasty  32926 Arthroscopy, with meniscectomy (medial AND lateral)  36601 Arthroscopy, knee, synovectomy, limited  86509 Arthroscopy, with lysis of adhesions  Possible arthroscopic cartilage biopsy  on 6/13/23.      NO NERVE BLOCK OR CATHETER    She is a healthy individual and does not need clearance for this procedure.     Risks, indications and benefits of the surgical procedure were discussed with the patient. All questions with regard to surgery, rehab, expected return to functional activities, activities of daily living and recreational endeavors were answered to her satisfaction.    Discussed COVID-19 with the patient, they are aware of our current policies and procedures, were given the option of delaying surgery, and they elect to proceed.    Patient was informed and understands the risks of surgery are greater for patients with a current condition or history of heart disease, obesity, clotting disorders, recurrent infections, steroid use, current or past smoking, and factors such as sedentary lifestyle and noncompliance with medications, therapy or follow-up. The degree of the increased risk is hard to estimate with any degree of precision.    Once no other questions were asked, a brief history and physical exam was then performed.    PAST MEDICAL HISTORY:   Past Medical History:   Diagnosis Date    Asthma      PAST SURGICAL HISTORY:   Past Surgical History:   Procedure Laterality Date    ANTERIOR CRUCIATE LIGAMENT REPAIR Bilateral     ANTERIOR CRUCIATE LIGAMENT REPAIR Left     microfracture knee Right      FAMILY HISTORY: No family history on file.  SOCIAL HISTORY:   Social History     Socioeconomic History    Marital status: Single       MEDICATIONS: No current outpatient medications on file.  ALLERGIES:   Review of  patient's allergies indicates:   Allergen Reactions    Ceftin [cefuroxime axetil] Other (See Comments)     Bloody stool       Review of Systems   Constitution: Negative. Negative for chills, fever and night sweats.   HENT: Negative for congestion and headaches.    Eyes: Negative for blurred vision, left vision loss and right vision loss.   Cardiovascular: Negative for chest pain and syncope.   Respiratory: Negative for cough and shortness of breath.    Endocrine: Negative for polydipsia, polyphagia and polyuria.   Hematologic/Lymphatic: Negative for bleeding problem. Does not bruise/bleed easily.   Skin: Negative for dry skin, itching and rash.   Musculoskeletal: Negative for falls and muscle weakness.   Gastrointestinal: Negative for abdominal pain and bowel incontinence.   Genitourinary: Negative for bladder incontinence and nocturia.   Neurological: Negative for disturbances in coordination, loss of balance and seizures.   Psychiatric/Behavioral: Negative for depression. The patient does not have insomnia.    Allergic/Immunologic: Negative for hives and persistent infections.     PHYSICAL EXAM:  GEN: A&Ox3, WD WN NAD  HEENT: WNL  CHEST: CTAB, no W/R/R  HEART: RRR, no M/R/G   ABD: Soft, NT ND, BS x4 QUADS  MS: Refer to previous note for detailed MS exam  NEURO: CN II-XII intact       The surgical consent was then reviewed with the patient, who agreed with all the contents of the consent form and it was signed. she was instructed to wait for a phone call from the anesthesia department prior to surgery to discuss past medical history, medications, and clearance. Also, informed she may be required to get additional testing per the anesthesia department prior to having surgery.     PHYSICAL THERAPY:  She was also instructed regarding physical therapy and will begin POD # 1-3. She is doing physical therapy at [facility].    POST OP CARE:instructions were reviewed including care of the wound and  dressing after surgery and when she can shower.     PAIN MANAGEMENT: Jacqui Nobles was also given a pain management regime, which includes the TENS unit given to her by our DME team, along with the education required for its use. She was also instructed regarding the ice wrap that will be in place after surgery and her postoperative pain medications.     PAIN MEDICATION:  Norco 5/325mg 1 po q 4-6 hours prn pain  Phenergan 25 mg one p.o. q.4-6 hours p.r.n. nausea and vomiting.  Celebrex 200 mg BID  Robaxin 500mg TID PRN  Aspirin 325mg daily x 6 weeks for DVT prophylaxis starting on the evening after surgery.    Post op meds to be delivered bedside prior to discharge. Deliver to family if patient is in surgery at 5pm.     Patient denies history of seizures.     Patient will also use bilateral TEDs on lower extremities, SCDs during surgery, and early ambulation post-op. If the patient was previously taking 81mg baby aspirin, they may have been told not to hold for procedure.     Patient was also told to buy over the counter Prilosec medication and take it once daily for GI protection as long as they are taking NSAIDs or Aspirin.    DVT prophylaxis was discussed with the patient today including risk factors for developing DVTs and history of DVTs. The patient was asked if any specific recommendations were given from the doctor/s that did pre-operative surgical clearance. I may have prescribed a mechanical DVT prophylaxis device, , for the above listed patient, to reduce the risk for clot formation and complications that can arise from a DVT. In my professional medical opinion, I consider this medically necessary and have prescribed the device for the purpose of postoperative treatment and rehabilitation. This can treat both the acute and chronic aspects of the inflammatory reaction that accompanies trauma and surgery. Additionally, I am prescribing mechanical DVT prophylaxis because the patient will have restricted  mobility post-operatively and is at high risk for DVT. Failure to approve this device will compromise the outcome of this patient's healing process.     Patient was asked if they were taking or using OCP pills or devices. If they answered yes, then they were instructed to stop using OCPs at this pre-operative appointment until 2 months post-op to help prevent DVT development. They understand that there are other forms of birth control that do not involve hormones. They expressed understanding that ignoring/not following this instruction could result in a DVT which could turn into a deadly pulmonary embolism.      The patient was told that narcotic pain medications may make them drowsy and instructions were given to not sign legal documents, drive or operate heavy machinery, cars, or equipment while under the influence of narcotic medications.     Dr. Palmer was present in clinic during this pre-op evaluation. The patient was offered the opportunity to ask Dr. Palmer any further questions regarding the procedure which may not have been addressed during their previous informed consent discussion. The patient has declined to see Dr. Palmer today.    As there were no other questions to be asked, she was given my business card along with Dr. Palmer business card if she has any questions or concerns prior to surgery or in the postop period.

## 2023-06-05 NOTE — PROGRESS NOTES
Jacqui Nobles  is here for a completion of her perioperative paperwork. she  Is scheduled to undergo right Knee:     42278 Arthroscopy, debridement/shaving of articular cartilage (Chondroplasty  19174 Arthroscopy, with meniscectomy (medial AND lateral)  71093 Arthroscopy, knee, synovectomy, limited  04663 Arthroscopy, with lysis of adhesions  Possible arthroscopic cartilage biopsy  on 6/13/23.      NO NERVE BLOCK OR CATHETER    She is a healthy individual and does not need clearance for this procedure.     Risks, indications and benefits of the surgical procedure were discussed with the patient. All questions with regard to surgery, rehab, expected return to functional activities, activities of daily living and recreational endeavors were answered to her satisfaction.    Discussed COVID-19 with the patient, they are aware of our current policies and procedures, were given the option of delaying surgery, and they elect to proceed.    Patient was informed and understands the risks of surgery are greater for patients with a current condition or history of heart disease, obesity, clotting disorders, recurrent infections, steroid use, current or past smoking, and factors such as sedentary lifestyle and noncompliance with medications, therapy or follow-up. The degree of the increased risk is hard to estimate with any degree of precision.    Once no other questions were asked, a brief history and physical exam was then performed.    PAST MEDICAL HISTORY:   Past Medical History:   Diagnosis Date    Asthma      PAST SURGICAL HISTORY:   Past Surgical History:   Procedure Laterality Date    ANTERIOR CRUCIATE LIGAMENT REPAIR Bilateral     ANTERIOR CRUCIATE LIGAMENT REPAIR Left     microfracture knee Right      FAMILY HISTORY: No family history on file.  SOCIAL HISTORY:   Social History     Socioeconomic History    Marital status: Single       MEDICATIONS: No current outpatient medications on file.  ALLERGIES:   Review of  patient's allergies indicates:   Allergen Reactions    Ceftin [cefuroxime axetil] Other (See Comments)     Bloody stool       Review of Systems   Constitution: Negative. Negative for chills, fever and night sweats.   HENT: Negative for congestion and headaches.    Eyes: Negative for blurred vision, left vision loss and right vision loss.   Cardiovascular: Negative for chest pain and syncope.   Respiratory: Negative for cough and shortness of breath.    Endocrine: Negative for polydipsia, polyphagia and polyuria.   Hematologic/Lymphatic: Negative for bleeding problem. Does not bruise/bleed easily.   Skin: Negative for dry skin, itching and rash.   Musculoskeletal: Negative for falls and muscle weakness.   Gastrointestinal: Negative for abdominal pain and bowel incontinence.   Genitourinary: Negative for bladder incontinence and nocturia.   Neurological: Negative for disturbances in coordination, loss of balance and seizures.   Psychiatric/Behavioral: Negative for depression. The patient does not have insomnia.    Allergic/Immunologic: Negative for hives and persistent infections.     PHYSICAL EXAM:  GEN: A&Ox3, WD WN NAD  HEENT: WNL  CHEST: CTAB, no W/R/R  HEART: RRR, no M/R/G   ABD: Soft, NT ND, BS x4 QUADS  MS: Refer to previous note for detailed MS exam  NEURO: CN II-XII intact       The surgical consent was then reviewed with the patient, who agreed with all the contents of the consent form and it was signed. she was instructed to wait for a phone call from the anesthesia department prior to surgery to discuss past medical history, medications, and clearance. Also, informed she may be required to get additional testing per the anesthesia department prior to having surgery.     PHYSICAL THERAPY:  She was also instructed regarding physical therapy and will begin POD # 1-3. She is doing physical therapy at [facility].    POST OP CARE:instructions were reviewed including care of the wound and  dressing after surgery and when she can shower.     PAIN MANAGEMENT: Jacqui Nobles was also given a pain management regime, which includes the TENS unit given to her by our DME team, along with the education required for its use. She was also instructed regarding the ice wrap that will be in place after surgery and her postoperative pain medications.     PAIN MEDICATION:  Norco 5/325mg 1 po q 4-6 hours prn pain  Phenergan 25 mg one p.o. q.4-6 hours p.r.n. nausea and vomiting.  Celebrex 200 mg BID  Robaxin 500mg TID PRN  Aspirin 325mg daily x 6 weeks for DVT prophylaxis starting on the evening after surgery.    Post op meds to be delivered bedside prior to discharge. Deliver to family if patient is in surgery at 5pm.     Patient denies history of seizures.     Patient will also use bilateral TEDs on lower extremities, SCDs during surgery, and early ambulation post-op. If the patient was previously taking 81mg baby aspirin, they may have been told not to hold for procedure.     Patient was also told to buy over the counter Prilosec medication and take it once daily for GI protection as long as they are taking NSAIDs or Aspirin.    DVT prophylaxis was discussed with the patient today including risk factors for developing DVTs and history of DVTs. The patient was asked if any specific recommendations were given from the doctor/s that did pre-operative surgical clearance. I may have prescribed a mechanical DVT prophylaxis device, , for the above listed patient, to reduce the risk for clot formation and complications that can arise from a DVT. In my professional medical opinion, I consider this medically necessary and have prescribed the device for the purpose of postoperative treatment and rehabilitation. This can treat both the acute and chronic aspects of the inflammatory reaction that accompanies trauma and surgery. Additionally, I am prescribing mechanical DVT prophylaxis because the patient will have restricted  mobility post-operatively and is at high risk for DVT. Failure to approve this device will compromise the outcome of this patient's healing process.     Patient was asked if they were taking or using OCP pills or devices. If they answered yes, then they were instructed to stop using OCPs at this pre-operative appointment until 2 months post-op to help prevent DVT development. They understand that there are other forms of birth control that do not involve hormones. They expressed understanding that ignoring/not following this instruction could result in a DVT which could turn into a deadly pulmonary embolism.      The patient was told that narcotic pain medications may make them drowsy and instructions were given to not sign legal documents, drive or operate heavy machinery, cars, or equipment while under the influence of narcotic medications.     Dr. Palmer was present in clinic during this pre-op evaluation. The patient was offered the opportunity to ask Dr. Palmer any further questions regarding the procedure which may not have been addressed during their previous informed consent discussion. The patient has declined to see Dr. Palmer today.    As there were no other questions to be asked, she was given my business card along with Dr. Palmer business card if she has any questions or concerns prior to surgery or in the postop period.

## 2023-06-12 ENCOUNTER — TELEPHONE (OUTPATIENT)
Dept: SPORTS MEDICINE | Facility: CLINIC | Age: 43
End: 2023-06-12
Payer: COMMERCIAL

## 2023-06-12 ENCOUNTER — ANESTHESIA EVENT (OUTPATIENT)
Dept: SURGERY | Facility: HOSPITAL | Age: 43
End: 2023-06-12
Payer: COMMERCIAL

## 2023-06-12 NOTE — TELEPHONE ENCOUNTER
Spoke with patient and gave her arrival time for surgery tomorrow, 5am. She also requested to take cartilage biopsy tomorrow if there is a possibility that it is needed in the future. Will need to add to patients consent for cartilage biopsy    Rose Penny   Clinical Assistant to Dr. Skylar Palmer

## 2023-06-12 NOTE — TELEPHONE ENCOUNTER
Spoke with patient and told her to arrive for surgery at 5am tomorrow at [place].     Rose Penny   Clinical Assistant to Dr. Skylar Palmer

## 2023-06-13 ENCOUNTER — HOSPITAL ENCOUNTER (OUTPATIENT)
Facility: HOSPITAL | Age: 43
Discharge: HOME | End: 2023-06-13
Attending: ORTHOPAEDIC SURGERY | Admitting: ORTHOPAEDIC SURGERY
Payer: COMMERCIAL

## 2023-06-13 ENCOUNTER — ANESTHESIA (OUTPATIENT)
Dept: SURGERY | Facility: HOSPITAL | Age: 43
End: 2023-06-13
Payer: COMMERCIAL

## 2023-06-13 VITALS
OXYGEN SATURATION: 98 % | SYSTOLIC BLOOD PRESSURE: 135 MMHG | TEMPERATURE: 98 F | BODY MASS INDEX: 25.88 KG/M2 | HEART RATE: 56 BPM | WEIGHT: 161 LBS | RESPIRATION RATE: 20 BRPM | DIASTOLIC BLOOD PRESSURE: 76 MMHG | HEIGHT: 66 IN

## 2023-06-13 DIAGNOSIS — S83.281D OTHER TEAR OF LATERAL MENISCUS OF RIGHT KNEE AS CURRENT INJURY, SUBSEQUENT ENCOUNTER: ICD-10-CM

## 2023-06-13 DIAGNOSIS — M23.200 OLD COMPLEX TEAR OF LATERAL MENISCUS OF RIGHT KNEE: Primary | ICD-10-CM

## 2023-06-13 DIAGNOSIS — M94.261 CHONDROMALACIA OF RIGHT KNEE: ICD-10-CM

## 2023-06-13 DIAGNOSIS — S83.241D OTHER TEAR OF MEDIAL MENISCUS OF RIGHT KNEE AS CURRENT INJURY, SUBSEQUENT ENCOUNTER: ICD-10-CM

## 2023-06-13 LAB
B-HCG UR QL: NEGATIVE
CTP QC/QA: YES

## 2023-06-13 PROCEDURE — 71000039 HC RECOVERY, EACH ADD'L HOUR: Performed by: ORTHOPAEDIC SURGERY

## 2023-06-13 PROCEDURE — 36000710: Performed by: ORTHOPAEDIC SURGERY

## 2023-06-13 PROCEDURE — 63600175 PHARM REV CODE 636 W HCPCS: Performed by: PHYSICIAN ASSISTANT

## 2023-06-13 PROCEDURE — 81025 URINE PREGNANCY TEST: CPT | Performed by: PHYSICIAN ASSISTANT

## 2023-06-13 PROCEDURE — 27200651 HC AIRWAY, LMA: Performed by: STUDENT IN AN ORGANIZED HEALTH CARE EDUCATION/TRAINING PROGRAM

## 2023-06-13 PROCEDURE — D9220A PRA ANESTHESIA: ICD-10-PCS | Mod: ANES,,, | Performed by: STUDENT IN AN ORGANIZED HEALTH CARE EDUCATION/TRAINING PROGRAM

## 2023-06-13 PROCEDURE — 29881 PR KNEE SCOPE SINGLE MENISECECTOMY: ICD-10-PCS | Mod: RT,,, | Performed by: ORTHOPAEDIC SURGERY

## 2023-06-13 PROCEDURE — 25000003 PHARM REV CODE 250: Performed by: NURSE ANESTHETIST, CERTIFIED REGISTERED

## 2023-06-13 PROCEDURE — D9220A PRA ANESTHESIA: ICD-10-PCS | Mod: CRNA,,, | Performed by: NURSE ANESTHETIST, CERTIFIED REGISTERED

## 2023-06-13 PROCEDURE — 29881 ARTHRS KNE SRG MNISECTMY M/L: CPT | Mod: RT,,, | Performed by: ORTHOPAEDIC SURGERY

## 2023-06-13 PROCEDURE — 94761 N-INVAS EAR/PLS OXIMETRY MLT: CPT

## 2023-06-13 PROCEDURE — D9220A PRA ANESTHESIA: Mod: CRNA,,, | Performed by: NURSE ANESTHETIST, CERTIFIED REGISTERED

## 2023-06-13 PROCEDURE — 37000009 HC ANESTHESIA EA ADD 15 MINS: Performed by: ORTHOPAEDIC SURGERY

## 2023-06-13 PROCEDURE — 36000711: Performed by: ORTHOPAEDIC SURGERY

## 2023-06-13 PROCEDURE — 37000008 HC ANESTHESIA 1ST 15 MINUTES: Performed by: ORTHOPAEDIC SURGERY

## 2023-06-13 PROCEDURE — D9220A PRA ANESTHESIA: Mod: ANES,,, | Performed by: STUDENT IN AN ORGANIZED HEALTH CARE EDUCATION/TRAINING PROGRAM

## 2023-06-13 PROCEDURE — 25000003 PHARM REV CODE 250: Performed by: PHYSICIAN ASSISTANT

## 2023-06-13 PROCEDURE — 63600175 PHARM REV CODE 636 W HCPCS: Performed by: NURSE ANESTHETIST, CERTIFIED REGISTERED

## 2023-06-13 PROCEDURE — 27201423 OPTIME MED/SURG SUP & DEVICES STERILE SUPPLY: Performed by: ORTHOPAEDIC SURGERY

## 2023-06-13 PROCEDURE — 63600175 PHARM REV CODE 636 W HCPCS: Performed by: ORTHOPAEDIC SURGERY

## 2023-06-13 PROCEDURE — 71000015 HC POSTOP RECOV 1ST HR: Performed by: ORTHOPAEDIC SURGERY

## 2023-06-13 PROCEDURE — 99900035 HC TECH TIME PER 15 MIN (STAT)

## 2023-06-13 PROCEDURE — 71000033 HC RECOVERY, INTIAL HOUR: Performed by: ORTHOPAEDIC SURGERY

## 2023-06-13 PROCEDURE — 25000003 PHARM REV CODE 250: Performed by: ORTHOPAEDIC SURGERY

## 2023-06-13 PROCEDURE — 25000003 PHARM REV CODE 250: Performed by: STUDENT IN AN ORGANIZED HEALTH CARE EDUCATION/TRAINING PROGRAM

## 2023-06-13 RX ORDER — SODIUM CHLORIDE 9 MG/ML
INJECTION, SOLUTION INTRAVENOUS CONTINUOUS
Status: DISCONTINUED | OUTPATIENT
Start: 2023-06-13 | End: 2023-06-13 | Stop reason: HOSPADM

## 2023-06-13 RX ORDER — DEXMEDETOMIDINE HYDROCHLORIDE 100 UG/ML
INJECTION, SOLUTION INTRAVENOUS
Status: DISCONTINUED | OUTPATIENT
Start: 2023-06-13 | End: 2023-06-13

## 2023-06-13 RX ORDER — DEXAMETHASONE SODIUM PHOSPHATE 4 MG/ML
INJECTION, SOLUTION INTRA-ARTICULAR; INTRALESIONAL; INTRAMUSCULAR; INTRAVENOUS; SOFT TISSUE
Status: DISCONTINUED | OUTPATIENT
Start: 2023-06-13 | End: 2023-06-13

## 2023-06-13 RX ORDER — CELECOXIB 200 MG/1
400 CAPSULE ORAL ONCE
Status: COMPLETED | OUTPATIENT
Start: 2023-06-13 | End: 2023-06-13

## 2023-06-13 RX ORDER — FAMOTIDINE 10 MG/ML
INJECTION INTRAVENOUS
Status: DISCONTINUED | OUTPATIENT
Start: 2023-06-13 | End: 2023-06-13

## 2023-06-13 RX ORDER — EPINEPHRINE 1 MG/ML
INJECTION INTRAMUSCULAR; INTRAVENOUS; SUBCUTANEOUS
Status: DISCONTINUED | OUTPATIENT
Start: 2023-06-13 | End: 2023-06-13 | Stop reason: HOSPADM

## 2023-06-13 RX ORDER — ROPIVACAINE HYDROCHLORIDE 2 MG/ML
INJECTION, SOLUTION EPIDURAL; INFILTRATION; PERINEURAL CONTINUOUS
Status: ACTIVE | OUTPATIENT
Start: 2023-06-13

## 2023-06-13 RX ORDER — ROPIVACAINE/EPI/CLONIDINE/KET 2.46-0.005
SYRINGE (ML) INJECTION
Status: DISCONTINUED | OUTPATIENT
Start: 2023-06-13 | End: 2023-06-13 | Stop reason: HOSPADM

## 2023-06-13 RX ORDER — ROPIVACAINE/EPI/CLONIDINE/KET 2.46-0.005
SYRINGE (ML) INJECTION ONCE
Status: DISCONTINUED | OUTPATIENT
Start: 2023-06-13 | End: 2023-06-13 | Stop reason: HOSPADM

## 2023-06-13 RX ORDER — FENTANYL CITRATE 50 UG/ML
25 INJECTION, SOLUTION INTRAMUSCULAR; INTRAVENOUS EVERY 5 MIN PRN
Status: DISCONTINUED | OUTPATIENT
Start: 2023-06-13 | End: 2023-06-13 | Stop reason: HOSPADM

## 2023-06-13 RX ORDER — HYDROMORPHONE HYDROCHLORIDE 1 MG/ML
0.2 INJECTION, SOLUTION INTRAMUSCULAR; INTRAVENOUS; SUBCUTANEOUS EVERY 5 MIN PRN
Status: DISCONTINUED | OUTPATIENT
Start: 2023-06-13 | End: 2023-06-13 | Stop reason: HOSPADM

## 2023-06-13 RX ORDER — HALOPERIDOL 5 MG/ML
0.5 INJECTION INTRAMUSCULAR EVERY 10 MIN PRN
Status: DISCONTINUED | OUTPATIENT
Start: 2023-06-13 | End: 2023-06-13 | Stop reason: HOSPADM

## 2023-06-13 RX ORDER — FENTANYL CITRATE 50 UG/ML
INJECTION, SOLUTION INTRAMUSCULAR; INTRAVENOUS
Status: DISCONTINUED | OUTPATIENT
Start: 2023-06-13 | End: 2023-06-13

## 2023-06-13 RX ORDER — ACETAMINOPHEN 500 MG
1000 TABLET ORAL
Status: COMPLETED | OUTPATIENT
Start: 2023-06-13 | End: 2023-06-13

## 2023-06-13 RX ORDER — EPHEDRINE SULFATE 50 MG/ML
INJECTION, SOLUTION INTRAVENOUS
Status: DISCONTINUED | OUTPATIENT
Start: 2023-06-13 | End: 2023-06-13

## 2023-06-13 RX ORDER — PROPOFOL 10 MG/ML
VIAL (ML) INTRAVENOUS
Status: DISCONTINUED | OUTPATIENT
Start: 2023-06-13 | End: 2023-06-13

## 2023-06-13 RX ORDER — MIDAZOLAM HYDROCHLORIDE 1 MG/ML
INJECTION INTRAMUSCULAR; INTRAVENOUS
Status: DISCONTINUED | OUTPATIENT
Start: 2023-06-13 | End: 2023-06-13

## 2023-06-13 RX ORDER — OXYCODONE HYDROCHLORIDE 5 MG/1
5 TABLET ORAL
Status: DISCONTINUED | OUTPATIENT
Start: 2023-06-13 | End: 2023-06-13 | Stop reason: HOSPADM

## 2023-06-13 RX ORDER — FENTANYL CITRATE 50 UG/ML
25-200 INJECTION, SOLUTION INTRAMUSCULAR; INTRAVENOUS
Status: DISPENSED | OUTPATIENT
Start: 2023-06-13

## 2023-06-13 RX ORDER — ONDANSETRON 2 MG/ML
4 INJECTION INTRAMUSCULAR; INTRAVENOUS DAILY PRN
Status: DISCONTINUED | OUTPATIENT
Start: 2023-06-13 | End: 2023-06-13 | Stop reason: HOSPADM

## 2023-06-13 RX ORDER — MIDAZOLAM HYDROCHLORIDE 1 MG/ML
.5-4 INJECTION INTRAMUSCULAR; INTRAVENOUS
Status: DISPENSED | OUTPATIENT
Start: 2023-06-13

## 2023-06-13 RX ORDER — KETAMINE HCL IN 0.9 % NACL 50 MG/5 ML
SYRINGE (ML) INTRAVENOUS
Status: DISCONTINUED | OUTPATIENT
Start: 2023-06-13 | End: 2023-06-13

## 2023-06-13 RX ORDER — ONDANSETRON 2 MG/ML
INJECTION INTRAMUSCULAR; INTRAVENOUS
Status: DISCONTINUED | OUTPATIENT
Start: 2023-06-13 | End: 2023-06-13

## 2023-06-13 RX ORDER — LIDOCAINE HYDROCHLORIDE 10 MG/ML
INJECTION, SOLUTION INTRAVENOUS
Status: DISCONTINUED | OUTPATIENT
Start: 2023-06-13 | End: 2023-06-13

## 2023-06-13 RX ADMIN — EPHEDRINE SULFATE 10 MG: 50 INJECTION INTRAVENOUS at 07:06

## 2023-06-13 RX ADMIN — SODIUM CHLORIDE: 9 INJECTION, SOLUTION INTRAVENOUS at 07:06

## 2023-06-13 RX ADMIN — SODIUM CHLORIDE: 0.9 INJECTION, SOLUTION INTRAVENOUS at 06:06

## 2023-06-13 RX ADMIN — ONDANSETRON 4 MG: 2 INJECTION, SOLUTION INTRAMUSCULAR; INTRAVENOUS at 07:06

## 2023-06-13 RX ADMIN — VANCOMYCIN HYDROCHLORIDE 1000 MG: 1 INJECTION, POWDER, LYOPHILIZED, FOR SOLUTION INTRAVENOUS at 06:06

## 2023-06-13 RX ADMIN — CELECOXIB 400 MG: 200 CAPSULE ORAL at 06:06

## 2023-06-13 RX ADMIN — MIDAZOLAM HYDROCHLORIDE 2 MG: 1 INJECTION, SOLUTION INTRAMUSCULAR; INTRAVENOUS at 07:06

## 2023-06-13 RX ADMIN — ACETAMINOPHEN 1000 MG: 500 TABLET ORAL at 06:06

## 2023-06-13 RX ADMIN — FAMOTIDINE 20 MG: 10 INJECTION, SOLUTION INTRAVENOUS at 07:06

## 2023-06-13 RX ADMIN — DEXMEDETOMIDINE HYDROCHLORIDE 20 MCG: 100 INJECTION, SOLUTION INTRAVENOUS at 07:06

## 2023-06-13 RX ADMIN — FENTANYL CITRATE 50 MCG: 50 INJECTION, SOLUTION INTRAMUSCULAR; INTRAVENOUS at 07:06

## 2023-06-13 RX ADMIN — LIDOCAINE HYDROCHLORIDE 100 MG: 10 INJECTION, SOLUTION INTRAVENOUS at 07:06

## 2023-06-13 RX ADMIN — PROPOFOL 200 MG: 10 INJECTION, EMULSION INTRAVENOUS at 07:06

## 2023-06-13 RX ADMIN — Medication 20 MG: at 07:06

## 2023-06-13 RX ADMIN — DEXAMETHASONE SODIUM PHOSPHATE 8 MG: 4 INJECTION, SOLUTION INTRAMUSCULAR; INTRAVENOUS at 07:06

## 2023-06-13 NOTE — PLAN OF CARE
Patient prepped for procedure.  POC reviewed with pt, who verbalized understanding.    Outpatient pharmacy confirmed.      Patient will need crutches at discharge.    All belongings to remain in locker during procedure.     Patient's S/O Hamzah not present at pre-op.  He will be coming from work about 30 mins away.  Call for .     26

## 2023-06-13 NOTE — TRANSFER OF CARE
"Anesthesia Transfer of Care Note    Patient: Jacqui Nobles    Procedure(s) Performed: Procedure(s) (LRB):  ARTHROSCOPY, KNEE, WITH CHONDROPLASTY (Right)  ARTHROSCOPY, KNEE, WITH MENISCECTOMY (Right)  SYNOVECTOMY, KNEE, LIMITED (Right)  BIOPSY, MASS, LOWER EXTREMITY (Right)    Patient location: PACU    Anesthesia Type: general    Transport from OR: Transported from OR on room air with adequate spontaneous ventilation. Transported from OR on 6-10 L/min O2 by face mask with adequate spontaneous ventilation    Post pain: adequate analgesia    Post assessment: no apparent anesthetic complications and tolerated procedure well    Post vital signs: stable    Level of consciousness: awake    Nausea/Vomiting: no nausea/vomiting    Complications: none    Transfer of care protocol was followed      Last vitals:   Visit Vitals  /64 (BP Location: Right arm, Patient Position: Lying)   Pulse 64   Temp 36.9 °C (98.4 °F) (Temporal)   Resp 16   Ht 5' 6" (1.676 m)   Wt 73 kg (161 lb)   LMP 05/30/2023 (Within Days)   SpO2 98%   Breastfeeding No   BMI 25.99 kg/m²     "

## 2023-06-13 NOTE — ANESTHESIA POSTPROCEDURE EVALUATION
Anesthesia Post Evaluation    Patient: Jacqui Nobles    Procedure(s) Performed: Procedure(s) (LRB):  ARTHROSCOPY, KNEE, WITH CHONDROPLASTY (Right)  ARTHROSCOPY, KNEE, WITH MENISCECTOMY (Right)  SYNOVECTOMY, KNEE, LIMITED (Right)  BIOPSY, MASS, LOWER EXTREMITY (Right)    Final Anesthesia Type: general      Patient location during evaluation: PACU  Patient participation: Yes- Able to Participate  Level of consciousness: awake and alert  Post-procedure vital signs: reviewed and stable  Pain management: adequate  Airway patency: patent  ALVAREZ mitigation strategies: Multimodal analgesia  PONV status at discharge: No PONV  Anesthetic complications: no      Cardiovascular status: blood pressure returned to baseline and hemodynamically stable  Respiratory status: unassisted  Hydration status: euvolemic  Follow-up not needed.          Vitals Value Taken Time   /76 06/13/23 0917   Temp 36.8 °C (98.2 °F) 06/13/23 0930   Pulse 58 06/13/23 0921   Resp 18 06/13/23 0920   SpO2 100 % 06/13/23 0921   Vitals shown include unvalidated device data.      Event Time   Out of Recovery 09:20:00         Pain/Joy Score: Pain Rating Prior to Med Admin: 0 (6/13/2023  6:05 AM)  Joy Score: 10 (6/13/2023  9:27 AM)

## 2023-06-13 NOTE — OP NOTE
[place] - Surgery ([facility])  Operative Note      Date of Procedure: 6/13/2023     Procedure: 1. Right  Arthroscopy, debridement/shaving of articular cartilage (chondroplasty) 58226  2.  Right  Arthroscopy, with meniscectomy (medial OR lateral) 04429, Lateral  3.  Right  Arthroscopy, knee, synovectomy, limited 04942  4.  Right  Cartilage biopsy 17964    Surgeon(s) and Role:     * Skylar Palmer MD - Primary    Assisting Surgeon:     MD Chanel Cain SMA  It was medically necessary for Aaron Carlos MD to perform first assistant duties aiding in preparation and closure    Pre-Operative Diagnosis: Chondromalacia of right knee [M94.261]  Old tear of lateral meniscus of right knee, unspecified tear type [M23.200]  Old tear of medial meniscus of right knee, unspecified tear type [M23.203]  Internal derangement of right knee [M23.91]    Post-Operative Diagnosis: Post-Op Diagnosis Codes:     * Chondromalacia of right knee [M94.261]     * Old tear of lateral meniscus of right knee, unspecified tear type [M23.200]     * Old tear of medial meniscus of right knee, unspecified tear type [M23.203]     * Internal derangement of right knee [M23.91]    Anesthesia: General    Operative Findings (including complications, if any): Right knee chondromalacia femur and tibia, truncated medial meniscus, lateral meniscus tear    Description of Technical Procedures: DATE OF PROCEDURE: 6/13/2023    ATTENDING SURGEON: Surgeon(s) and Role:     * Skylar Palmer MD - Primary    Assistants:  MD Chanel Cain SMA  It was medically necessary for Aaron Carlos MD to perform first assistant duties aiding in preparation and closure       PREOPERATIVE DIAGNOSIS:  Right  Chondromalacia, (excludes patella) M94.29, Synovitis M65.9, and Tear, Lateral meniscus, acute S83.289A    POSTOPERATIVE DIAGNOSIS:   Right  Chondromalacia, (excludes patella) M94.29, Synovitis M65.9, and Tear, Lateral meniscus, acute  S83.289A    PROCEDURES(S) PERFORMED:   1. Right  Arthroscopy, debridement/shaving of articular cartilage (chondroplasty) 17167  2.  Right  Arthroscopy, with meniscectomy (medial OR lateral) 06138, Lateral  3.  Right  Arthroscopy, knee, synovectomy, limited 64215  4.  Right  Cartilage biopsy 43117      ANESTHESIA: General LMA anesthesia, NO block,Local anesthetic: 30cc EDGAR    FLUIDS IN THE CASE: 1000 ml    ESTIMATED BLOOD LOSS: Minimal    URINE OUTPUT: 0 ml    COMPLICATIONS: none    CONDITION ON RETURN TO RECOVERY ROOM: Good      Findings:     ARTICULAR CARTILAGE LESION(S):  Medial Femoral Condyle: ICRS Grade 4A      Size: 2.5 x 2.5 cm  Medial tibial plateau: ICRS Grade 0      Size: none        Lateral Femoral Condyle: ICRS Grade 4B      Size: 3.0 x 3.0 cm  Lateral tibial plateau: ICRS Grade 4A      Size: 2.0 x 2.0 cm        Patellar surface: ICRS Grade 0      Size: none  Trochlear groove: ICRS Grade 0      Size: none      EXAMINATION UNDER ANESTHESIA:   Extension 0 degrees  Flexion 0 degrees  Lachman Maneuver:  1A  Anterior Drawer: Negative  Pivot Shift: Negative  Posterior Drawer:  Negative  Varus stability @ 30 degrees: 0  Valgus stability @ 30 degrees: 0  Patellar glide:1 quadrant lateral, 2 quadrant medial      Meniscal status:  Medial meniscus:   Truncated meniscus  Tear location:      Lateral meniscus:   complex  Tear location:  central body tear         Expand All Collapse All       IMPLANTS UTILIZED: [employer] biopsy kit    INDICATIONS FOR OPERATIVE PROCEDURE: Jacqui Nobles is a 42 y.o.  female with history of right knee pain and pathology. The patient's history and physical examination findings consistent with the procedure performed. He noted significant problems in the area of concern with problems on activities of daily living and aggressive use of the right leg. As a result of these problems and problems with overall activity level, the patient was deemed to be an appropriate candidate for operative  intervention. Nonoperative versus operative options were discussed. The risks and benefits were discussed with the patient. The patient acknowledged understanding and wished to proceed with operative intervention. Informed consent was obtained prior to the procedure. Details of the surgical procedure were explained, including incisions and probable rehabilitation course. The patient understands the likely length of convalescence after surgery; and we have explained the risks, benefits, and alternatives of surgery. Reasonable expectations and potential complications were discussed and acknowledged, including but not limited to infection, bleeding, blood clots, (DVT and/or PE), nerve injury, retear, instability, continued pain and stiffness. It was also explained that there was a chance of failure which may require further management. The patient agreed and understood and wished to proceed.       DESCRIPTION OF PROCEDURE: The patient was brought into the Operating Room and placed in supine position. Upon application of no block in the preoperative holding area, the patient underwent General to stabilize the airway. The patient was given the appropriate dose of antibiotics based on body weight. Timeout was utilized to verify the side as the operative side. Both upper extremities were placed in comfortable position. Examination under anesthesia was performed. The nonoperative leg was carefully padded along the heel and peroneal nerve regions and then placed into a well padded well leg rosario. The operative leg was then placed into an arthroscopic leg rosario with a tourniquet applied proximally.  No bump was placed under the hip on the operative side. The operative leg was prepped and draped in a sterile fashion with ChloraPrep material.    We injected 0.5% ropivacaine mixture into the anterolateral and anteromedial aspect of the knee with application of 15 mL per portal site. A #11 blade was used to make the  arthroscopic portals. Blunt trocar and sheath were inserted into the intercondylar notch and then subsequently into the suprapatellar pouch. This patellofemoral joint was visualized, demonstrating normal lateral patellar tilt, normal patellar subluxation. The patellar tracked midline with flexion and extension. Arthroscopic pictures were obtained. There was no articular cartilage damage in the patellofemoral compartment The lesion if present was treated with observation.    Attention was turned to the intercondylar notch where the anterior cruciate ligament (ACL) and posterior cruciate ligament (PCL) structures were visualized. Visualization demonstrated mild insufficiency with previous vertically placed tunnel along the femur     Attention was then turned to the lateral compartment. The patient demonstrated a complex type tear of the central body region of the lateral meniscus. Arthroscopic instrumentation was used to carefully remove the tear and removing 20 % of the central body region of the lateral meniscus. The remaining meniscus structure laterally was found to be intact. articular cartilage damage was present in the lateral compartment as noted in the Findings section of this operative report. The lesion if present was treated witharthroscopic chondroplasty technique removing all irregular edges and flaps of articular cartilage at the lesion. A loose body was removed from the lateral compartment as well using a pituitary and the tissue was sent to pathology.    Attention was then turned to the medial compartment. The patient demonstrated  truncated medial meniscus.  Arthroscopic instrumentation was used to  veify no tear and pictures obtained. There was articular cartilage damage was present in the medial compartment as noted in the Findings section of this operative report. The lesion if present was treated with arthroscopic chondroplasty technique removing all irregular edges and flaps of articular  cartilage at the lesion.    Arthroscopic limited synovectomy was performed in the anterior medial and lateral regions of the knee. An arthroscopic assisted cartilage biopsy was obtained from the superormedial trochlear region obtaining 200-300 mg of cartilage and associated subchondral bone. This was placed into sterile saline and then into the Vericel media for transport to the facility under sterile and cool conditions for storage and later processing. The area was debrided with an arthroscopic shaver and final pictures obtained.     Final arthroscopic pictures were obtained. Fluid was extravasated from the joint.  tendon. Xeroform was applied along with application of sterile electrodes proximally and distally, gauze, ABD pads,cast padding, long-leg CARLEE hose stocking and cooling unit. No immobilizer was required postoperatively for this patient. The patient was then allowed to recover from anesthesia.  General was removed. The patient was taken to Recovery Room in  Good condition. At the completion case, all instrument and sponge counts were correct.    NOTE: I was present and scrubbed for the key portions of the procedure.    PHYSICAL THERAPY:  The patient should begin physical therapy on postoperative   day # 1-3 and will be advanced to outpatient therapy as soon as   Possible following discharge.  Weight bearing:as tolerated  right leg  Range of Motion:Full normal motion symmetric to opposite side     Additional exercises to be performed are:   to begin quad sets with a heel roll to obtain hyperextension, straight leg raise and heel slides with the heel supported in a closed-chain fashion    Immediate specific exercises should include:   Gait program should include the above stated weightbearing; in addition: active extension with a heel-to-toe gait working on maintaining extension    Discharge summary:  The patient was discharged to home in Good  Follow-up as scheduled preoperatively.    Medication(s): Refer  "to Discharge Medication List         Resume preoperative diet as tolerated    Activity per outpatient discharge instruction sheet     Significant Surgical Tasks Conducted by the Assistant(s), if Applicable: preparation and closure    Estimated Blood Loss (EBL): * No values recorded between 6/13/2023  7:26 AM and 6/13/2023  8:13 AM *           Implants: * No implants in log *    Specimens:   Specimen (24h ago, onward)      None                    Condition: Good    Disposition: PACU - hemodynamically stable.    Attestation: I was present and scrubbed for the key portions of the procedure.    Discharge Note    OUTCOME: Patient tolerated treatment/procedure well without complication and is now ready for discharge.    DISPOSITION: Home or Self Care    FINAL DIAGNOSIS:  Old tear of lateral meniscus of right knee    FOLLOWUP: In clinic    DISCHARGE INSTRUCTIONS:    Discharge Procedure Orders   CRUTCHES FOR HOME USE     Order Specific Question Answer Comments   Type: Axillary    Height: 5' 6" (1.676 m)    Weight: 73 kg (161 lb)    Length of need (1-99 months): 3      CRUTCHES FOR HOME USE     Order Specific Question Answer Comments   Type: Axillary    Height: 5' 6" (1.676 m)    Weight: 73 kg (161 lb)    Length of need (1-99 months): 3      Diet Adult Regular     Ice to affected area     Keep surgical extremity elevated     Notify your health care provider if you experience any of the following:  temperature >100.4     Notify your health care provider if you experience any of the following:  persistent nausea and vomiting or diarrhea     Notify your health care provider if you experience any of the following:  severe uncontrolled pain     Notify your health care provider if you experience any of the following:  redness, tenderness, or signs of infection (pain, swelling, redness, odor or green/yellow discharge around incision site)      Remove dressing in 72 hours     Weight bearing restrictions (specify):   Order Comments: " WBAT

## 2023-06-13 NOTE — PLAN OF CARE
VSS. Patient able to tolerate oral liquids. Patient reports tolerable pain level for discharge. Patient/family received home medication per bedside delivery. Dressing intact. Thigh TEDs intact. Patient instructed not to wear CARLEE hose without wearing closed-back shoes or  socks due to increased risk of falls, verbalized understanding. Crutches at bedside for home use. No distress noted. Patient states she is ready for discharge. Discharge instructions reviewed with patient and family, verbalized understanding. IV discontinued with catheter tip intact. Staff at bedside to help patient dress. Patient wheeled to lobby via staff.

## 2023-06-13 NOTE — ANESTHESIA PREPROCEDURE EVALUATION
2023  Pre-operative evaluation for Procedure(s) (LRB):  ARTHROSCOPY, KNEE, WITH CHONDROPLASTY (Right)  ARTHROSCOPY, KNEE, WITH MENISCECTOMY (Right)  SYNOVECTOMY, KNEE, LIMITED (Right)  ARTHROSCOPY, KNEE, LYSIS OF ADHESIONS (Right)  BIOPSY, MASS, LOWER EXTREMITY (Right)    Jacqui Nobles is a 42 y.o. female     Patient Active Problem List   Diagnosis    S/P right knee surgery    Chronic pain of both knees    Weakness of both hips    Decreased range of motion of hip    Arthritis of right knee    Acquired genu valgum of right knee       Review of patient's allergies indicates:   Allergen Reactions    Ceftin [cefuroxime axetil] Other (See Comments)     Bloody stool       No current facility-administered medications on file prior to encounter.     No current outpatient medications on file prior to encounter.       Past Surgical History:   Procedure Laterality Date    ANTERIOR CRUCIATE LIGAMENT REPAIR Bilateral     ANTERIOR CRUCIATE LIGAMENT REPAIR Left     microfracture knee Right        Social History     Socioeconomic History    Marital status: Single   Tobacco Use    Smoking status: Never    Smokeless tobacco: Never         CBC: No results for input(s): WBC, RBC, HGB, HCT, PLT, MCV, MCH, MCHC in the last 72 hours.    CMP: No results for input(s): NA, K, CL, CO2, BUN, CREATININE, GLU, MG, PHOS, CALCIUM, ALBUMIN, PROT, ALKPHOS, ALT, AST, BILITOT in the last 72 hours.    INR  No results for input(s): PT, INR, PROTIME, APTT in the last 72 hours.        Diagnostic Studies:      EKD Echo:  No results found for this or any previous visit.      Pre-op Assessment    I have reviewed the Patient Summary Reports.     I have reviewed the Nursing Notes. I have reviewed the NPO Status.   I have reviewed the Medications.     Review of Systems      Physical Exam  General: Well nourished and  Cooperative    Airway:  Mallampati: II   Mouth Opening: Normal  TM Distance: Normal  Tongue: Normal  Neck ROM: Normal ROM    Chest/Lungs:  Clear to auscultation, Normal Respiratory Rate    Heart:  Rate: Normal  Rhythm: Regular Rhythm  Sounds: Normal        Anesthesia Plan  Type of Anesthesia, risks & benefits discussed:    Anesthesia Type: Gen ETT, Gen Supraglottic Airway  Intra-op Monitoring Plan: Standard ASA Monitors  Post Op Pain Control Plan: multimodal analgesia and IV/PO Opioids PRN  Induction:  IV  Airway Plan: Direct and Video, Post-Induction  Informed Consent: Informed consent signed with the Patient and all parties understand the risks and agree with anesthesia plan.  All questions answered.   ASA Score: 1    Ready For Surgery From Anesthesia Perspective.     .

## 2023-06-13 NOTE — BRIEF OP NOTE
"[place] - Surgery ([facility])  Brief Operative Note    Surgery Date: 6/13/2023     Surgeon(s) and Role:     * Skylar Palmer MD - Primary    Assisting Surgeon: None    Pre-op Diagnosis:  Chondromalacia of right knee [M94.261]  Old tear of lateral meniscus of right knee, unspecified tear type [M23.200]  Old tear of medial meniscus of right knee, unspecified tear type [M23.203]  Internal derangement of right knee [M23.91]    Post-op Diagnosis:  Post-Op Diagnosis Codes:     * Chondromalacia of right knee [M94.261]     * Old tear of lateral meniscus of right knee, unspecified tear type [M23.200]     * Old tear of medial meniscus of right knee, unspecified tear type [M23.203]     * Internal derangement of right knee [M23.91]    Procedure(s) (LRB):  ARTHROSCOPY, KNEE, WITH CHONDROPLASTY (Right)  ARTHROSCOPY, KNEE, WITH MENISCECTOMY (Right)  SYNOVECTOMY, KNEE, LIMITED (Right)  BIOPSY, MASS, LOWER EXTREMITY (Right)    Anesthesia: General    Operative Findings:    * Chondromalacia of right knee [M94.261]     * Old tear of lateral meniscus of right knee, unspecified tear type [M23.200]     * Old tear of medial meniscus of right knee, unspecified tear type [M23.203]     * Internal derangement of right knee [M23.91]    Estimated Blood Loss: * No values recorded between 6/13/2023  7:26 AM and 6/13/2023  8:10 AM *         Specimens:   Specimen (24h ago, onward)      None              Discharge Note    OUTCOME: Patient tolerated treatment/procedure well without complication and is now ready for discharge.    DISPOSITION: Home or Self Care    FINAL DIAGNOSIS:  <principal problem not specified>    FOLLOWUP: In clinic    DISCHARGE INSTRUCTIONS:    Discharge Procedure Orders   CRUTCHES FOR HOME USE     Order Specific Question Answer Comments   Type: Axillary    Height: 5' 6" (1.676 m)    Weight: 73 kg (161 lb)    Length of need (1-99 months): 3        "

## 2023-06-13 NOTE — ANESTHESIA PROCEDURE NOTES
Intubation    Date/Time: 6/13/2023 7:13 AM  Performed by: Allie Avendano CRNA  Authorized by: Vivek Duron MD     Intubation:     Induction:  Intravenous    Intubated:  Postinduction    Mask Ventilation:  Easy mask    Attempts:  1    Attempted By:  CRNA    Method of Intubation:  Fast track LMA    Difficult Airway Encountered?: No      Complications:  None    Airway Device:  Supraglottic airway/LMA    Airway Device Size:  3.5    Style/Cuff Inflation:  Cuffed    Inflation Amount (mL):  5    Secured at:  The lips    Placement Verified By:  Capnometry    Complicating Factors:  None    Findings Post-Intubation:  BS equal bilateral

## 2023-06-13 NOTE — PATIENT INSTRUCTIONS
[address], [place]                                                                                          [phone]                   Postoperative Instructions for Knee Surgery                 Your Surgery Included:   Open               Arthroscopic   [] Ligament Repair       [] Diagnostic           [] ACL     [] PCL     [] MCL     [] PLLC      [x] Synovectomy / Plica Removal [] Meniscal Cartilage Repair / Transplantation      [] Lysis of Adhesions / Manipulation [] Articular Cartilage Repair      [] Interval Release           [] Cartimax       [] OATS   [] DAVID      [x] Meniscectomy           [] Osteochondral Allograft      [] Meniscal Cartilage Repair  [] Patellar Realignment       [x] Debridement / Chondroplasty         [] Lateral Release   [] Ligament Repair      [] Articular Cartilage Repair          [] Extensor Mechanism             []   Microfracture  []  OATS         [x]  Cartilage Biopsy [] Tendon Repair          [] Ligament Reconstruction          [] Patella                  [] Quadriceps             []   ACL    []   PCL  [] High Tibial Osteotomy       [] Subchondroplasty  [] Joint Replacement         [] Amniox Arthrocentesis           [] Unicompartmental   [] Patellofemoral       [] Distal Femoral Osteotomy                 Call our office ([phone]) immediately or message through [facility] if you experience any of the following:       Excessive bleeding or pus like drainage at the incision site       Uncontrollable pain not relieved by pain medication       Excessive swelling or redness at the incision site       Fever above 101.5 degrees not controlled with Tylenol or Motrin       Shortness of Breath or severe calf pain       Any foul odor or blistering from the surgery site    FOR EMERGENCIES: [facility] is the best way to contact us. If on the weekend, page the  at [phone] who will direct your call appropriately. If your procedure is a total  joint replacement, please call the number on your wristband.    1.   Multimodal Pain Management: A cold therapy cuff, pain medications, anti-inflammatories, local injections, TENs unit, and in some cases, regional anesthesia injections are used to manage your post-operative pain. The decision to use each of these options is based on their risks and benefits.     Medications: You were given one or more of the following medication prescriptions during your preoperative appointment. Follow the instructions on the bottles.     Narcotic Medication (usually Percocet, Roxicodone, or Norco): Begin taking the medication before your knee starts to hurt. Some patients do not like to take any medication, but if you wait until your pain is severe before taking, you will be very uncomfortable for several hours waiting for the narcotic to work. Always take with food.     Nausea / Vomiting: For this issue, we prescribe Phenergan or Zofran, use this medication as directed as needed for nausea.     Cold Therapy: You may have been sent home with an ice wrap. The cold can provide short-term pain relief, and limits swelling by reducing blood flow to the injured area. Apply the cold gel pack for 20 minutes and then take it off for 20 minutes. Use throughout the day, as needed to help relieve pain and control swelling.     Regional Anesthesia Injections (Blocks): You may have been given a regional nerve block/catheter either before or after surgery. This may make your entire leg numb for 2-5 days.                           * Proceed with caution when bearing weight on your leg.     2.   Diet: Eat a bland diet for the first day after surgery. Progress your diet as tolerated. Constipation may occur with Narcotic usage. We recommend Colace 100mg twice a day while taking narcotics.    3.   Activity: Limit your activity during the first 48 hours, keep your leg elevated with pillows under your heel. After the first 48 hours at home, increase  your activity level based on your symptoms.    4.   Dressing: (a) The soft, bulky dressing will be removed on the 3rd day after surgery. Place waterproof bandages at this time. Keep wounds as dry as possible for first 2 weeks. It is normal for some blood to be seen on the dressings. It is also normal for you to see apparent bruising on the skin around your incisions. If you are concerned by the drainage or the appearance of your wound site, you can send a picture via [facility].    5.   Shower: (a) You may shower on the 3rd day after surgery. Place waterproof bandages prior to shower. It is recommended to use Saran wrap before showering. Do not submerge limb for 4 weeks or incisions completely healed in any water.     6.   Your procedure did not require a post-operative brace.    7.   Your procedure did not require a Continuous Passive Motion (CPM) device.    8.   Weight Bearing: You may have been sent home with crutches. If instructed (see below), use these crutches at all times unless at complete rest.      [] Non-weight bearing for     0 weeks (you may touch your toes to the floor)      [] Partial weight bearing for  0 weeks    [] 25% Body Weight   [] 50% Body Weight      [x] Full weight bearing            [x]  NOW    []  after 0 weeks     9.  Knee Exercises: Begin these exercises the first day after surgery in order to help you regain your motion and strength. You may do the following marked exercises:     [x] Quad Sets - Begin activating your quadriceps muscle by driving your          knee downward into full knee extension while seated on a table or bed   with a towel rolled and propped under your heel     [x] Straight Leg Raise (SLR) - While edis your quadriceps muscle, lift     your fully extended leg to the level of your non-operative knee (as shown)     [x] Heel Slides - With the knee straight, slide your heel slowly toward your   buttocks, hold at the endpoint for 10-15 seconds, then slowly  "straighten     [x] Ankle pumps - With your knee straight, move your ankle in a "pumping"    fashion to activate your calf and leg muscles      10.  Physical Therapy: Physical therapy is an essential component to your recovery from surgery. Your physical therapy will start in 1 days.    FIRST POSTOPERATIVE VISIT: As scheduled.    e  "

## 2023-06-16 ENCOUNTER — CLINICAL SUPPORT (OUTPATIENT)
Dept: REHABILITATION | Facility: HOSPITAL | Age: 43
End: 2023-06-16
Attending: ORTHOPAEDIC SURGERY
Payer: COMMERCIAL

## 2023-06-16 DIAGNOSIS — R53.1 WEAKNESS: ICD-10-CM

## 2023-06-16 DIAGNOSIS — M25.661 DECREASED ROM OF RIGHT KNEE: ICD-10-CM

## 2023-06-16 DIAGNOSIS — R26.9 ABNORMAL GAIT: ICD-10-CM

## 2023-06-16 DIAGNOSIS — Z98.890 S/P RIGHT KNEE SURGERY: ICD-10-CM

## 2023-06-16 PROCEDURE — 97110 THERAPEUTIC EXERCISES: CPT | Performed by: PHYSICAL THERAPIST

## 2023-06-16 PROCEDURE — 97161 PT EVAL LOW COMPLEX 20 MIN: CPT | Performed by: PHYSICAL THERAPIST

## 2023-06-19 PROBLEM — R53.1 WEAKNESS: Status: ACTIVE | Noted: 2023-06-19

## 2023-06-19 PROBLEM — M25.661 DECREASED ROM OF RIGHT KNEE: Status: ACTIVE | Noted: 2023-06-19

## 2023-06-19 PROBLEM — R26.9 ABNORMAL GAIT: Status: ACTIVE | Noted: 2023-06-19

## 2023-06-19 NOTE — PROGRESS NOTES
[facility]   Physical Therapy Initial Evaluation      Name: Jacqui Nobles  Clinic Number: [id number]    Therapy Diagnosis:   Encounter Diagnoses   Name Primary?    S/P right knee surgery     Abnormal gait     Weakness     Decreased ROM of right knee         Physician: Skylar Palmer MD    Physician Orders: PT Eval and Treat   Medical Diagnosis from Referral:   M94.261 (ICD-10-CM) - Chondromalacia of right knee   M23.200 (ICD-10-CM) - Old tear of lateral meniscus of right knee, unspecified tear type   M23.203 (ICD-10-CM) - Old tear of medial meniscus of right knee, unspecified tear type   M23.91 (ICD-10-CM) - Internal derangement of right knee     Evaluation Date: 6/16/2023  Authorization Period Expiration: 7/16/23  Plan of Care Expiration: 12/31/23  Progress Note Due: 7/20/23  Visit # / Visits authorized: 1/ 1   FOTO: 0/0    Precautions: Standard     Time In: 1100  Time Out: 1210  Total Appointment Time (timed & untimed codes): 60 minutes    Subjective     Date of onset: 6/13/23    History of current condition - Jacqui reports: pt reports surgery on above date for R knee. States she is doing well. Minimal pain and swelling. Walking around without issues. Denies fever and s/s of infection and DVT.     Falls: none    Imaging: see chart:     Prior Therapy: 1 prior visit PT lizy  Social History:  lives with their partner  Occupation: [employer]  Prior Level of Function: independent, exercise but with pain  Current Level of Function: limited due to surgery    Pain:  Current 2/10, worst 4/10, best 2/10   Location: right kne=   Description: dull  Aggravating Factors: movement  Easing Factors: rest, ice    Patients goals: return to exercise and prior activities without pain     Medical History:   Past Medical History:   Diagnosis Date    Asthma        Surgical History:   Jacqui Nobles  has a past surgical history that includes Anterior cruciate ligament repair (Bilateral); microfracture knee  (Right); Anterior cruciate ligament repair (Left); Arthroscopic chondroplasty of knee joint (Right, 6/13/2023); Knee arthroscopy w/ meniscectomy (Right, 6/13/2023); Synovectomy of knee (Right, 6/13/2023); and Biopsy of mass of lower extremity (Right, 6/13/2023).    Medications:   Jacqui has a current medication list which includes the following prescription(s): aspirin, celecoxib, hydrocodone-acetaminophen, methocarbamol, and promethazine, and the following Facility-Administered Medications: fentanyl, midazolam, and ropivacaine (pf) 2 mg/ml (0.2%).    Allergies:   Review of patient's allergies indicates:   Allergen Reactions    Ceftin [cefuroxime axetil] Other (See Comments)     Bloody stool        Objective      Observation: pt presents in gym. No distress noted. Post op bandages in place. Incision sites and clean and dry. No s/s of infection. Replaced with bandaids    Gait: independent, mild decrease in knee ext in stance R      Range of Motion(*=pain):   Knee AROM PROM   Right 5-0-90 9-0-90   Left 15-0-140 15-0-140     Lower Extremity Strength  LLE 5/5 grossly  RLE: fair quad set. Unable to fully contract in full knee hyperextension    Joint Mobility: normal patella mobility     Palpation: tenderness along incision sites    Proximal/distal screen: full ankle and hip ROM    Sensation: intact BLE      Limitation/Restriction for FOTO  Survey    Therapist reviewed FOTO scores for Jacqui Nobles on 6/16/2023.   FOTO documents entered into EPIC - see Media section.    Limitation Score: see mdia         Treatment     Total Treatment time (time-based codes) separate from Evaluation: 20 minutes      Jacqui received the treatments listed below:      therapeutic exercises to develop strength and ROM for 20 minutes including:  See pt instructions for hep  Patient Education and Home Exercises     Education provided:   - educated in hep, prognosis, infection control    Written Home Exercises Provided: yes. Exercises were reviewed  and Jacqui was able to demonstrate them prior to the end of the session.  Jacqui demonstrated good  understanding of the education provided. See EMR under Patient Instructions for exercises provided during therapy sessions.    Assessment     Jacqui is a 42 y.o. female referred to outpatient Physical Therapy with a medical diagnosis of   M94.261 (ICD-10-CM) - Chondromalacia of right knee   M23.200 (ICD-10-CM) - Old tear of lateral meniscus of right knee, unspecified tear type   M23.203 (ICD-10-CM) - Old tear of medial meniscus of right knee, unspecified tear type   M23.91 (ICD-10-CM) - Internal derangement of right knee   . Patient presents with decreased ROM, weakness, pain, decreased functional mobility secondary to the above dx. Pt would benefit from skilled PT in order to maximize function.     Patient prognosis is Excellent.   Patient will benefit from skilled outpatient Physical Therapy to address the deficits stated above and in the chart below, provide patient /family education, and to maximize patientt's level of independence.     Plan of care discussed with patient: Yes  Patient's spiritual, cultural and educational needs considered and patient is agreeable to the plan of care and goals as stated below:     Anticipated Barriers for therapy: none    Medical Necessity is demonstrated by the following  History  Co-morbidities and personal factors that may impact the plan of care Co-morbidities:   none    Personal Factors:   none     low   Examination  Body Structures and Functions, activity limitations and participation restrictions that may impact the plan of care Body Regions:   lower extremities    Body Systems:    ROM  strength  balance  gait    Participation Restrictions:   none    Activity limitations:   Learning and applying knowledge  no deficits    General Tasks and Commands  no deficits    Communication  no deficits    Mobility  lifting and carrying objects  walking    Self care  no  deficits    Domestic Life  doing house work (cleaning house, washing dishes, laundry)    Interactions/Relationships  no deficits    Life Areas  no deficits    Community and Social Life  community life  recreation and leisure         low   Clinical Presentation stable and uncomplicated low   Decision Making/ Complexity Score: low     Goals:  Short Term Goals: 6 weeks   Pt independent in initial hep  Pain 0/10  Full AROM  Pt reports 50% improvement in function    Long Term Goals: 12-16 weeks   Pt independent in d/c hep  Pain 0/10  Quad strength 85% or better HHD or biodex  Pt reports 90% improvement in function.   Plan     Plan of care Certification: 6/16/2023 to 12/31/23.    Outpatient Physical Therapy 1 -3times weekly for 16 weeks to include the following interventions: Manual Therapy, Moist Heat/ Ice, Neuromuscular Re-ed, Patient Education, Therapeutic Activities, and Therapeutic Exercise.     Saeed Morataya, PT

## 2023-06-20 NOTE — PLAN OF CARE
[facility]   Physical Therapy Initial Evaluation      Name: Jacqui Nobles  Clinic Number: [id number]    Therapy Diagnosis:   Encounter Diagnoses   Name Primary?    S/P right knee surgery     Abnormal gait     Weakness     Decreased ROM of right knee         Physician: Skylar Palmer MD    Physician Orders: PT Eval and Treat   Medical Diagnosis from Referral:   M94.261 (ICD-10-CM) - Chondromalacia of right knee   M23.200 (ICD-10-CM) - Old tear of lateral meniscus of right knee, unspecified tear type   M23.203 (ICD-10-CM) - Old tear of medial meniscus of right knee, unspecified tear type   M23.91 (ICD-10-CM) - Internal derangement of right knee     Evaluation Date: 6/16/2023  Authorization Period Expiration: 7/16/23  Plan of Care Expiration: 12/31/23  Progress Note Due: 7/20/23  Visit # / Visits authorized: 1/ 1   FOTO: 0/0    Precautions: Standard     Time In: 1100  Time Out: 1210  Total Appointment Time (timed & untimed codes): 60 minutes    Subjective     Date of onset: 6/13/23    History of current condition - Jacqui reports: pt reports surgery on above date for R knee. States she is doing well. Minimal pain and swelling. Walking around without issues. Denies fever and s/s of infection and DVT.     Falls: none    Imaging: see chart:     Prior Therapy: 1 prior visit PT lizy  Social History:  lives with their partner  Occupation: [employer]  Prior Level of Function: independent, exercise but with pain  Current Level of Function: limited due to surgery    Pain:  Current 2/10, worst 4/10, best 2/10   Location: right kne=   Description: dull  Aggravating Factors: movement  Easing Factors: rest, ice    Patients goals: return to exercise and prior activities without pain     Medical History:   Past Medical History:   Diagnosis Date    Asthma        Surgical History:   Jacqui Nobles  has a past surgical history that includes Anterior cruciate ligament repair (Bilateral); microfracture knee  (Right); Anterior cruciate ligament repair (Left); Arthroscopic chondroplasty of knee joint (Right, 6/13/2023); Knee arthroscopy w/ meniscectomy (Right, 6/13/2023); Synovectomy of knee (Right, 6/13/2023); and Biopsy of mass of lower extremity (Right, 6/13/2023).    Medications:   Jacqui has a current medication list which includes the following prescription(s): aspirin, celecoxib, hydrocodone-acetaminophen, methocarbamol, and promethazine, and the following Facility-Administered Medications: fentanyl, midazolam, and ropivacaine (pf) 2 mg/ml (0.2%).    Allergies:   Review of patient's allergies indicates:   Allergen Reactions    Ceftin [cefuroxime axetil] Other (See Comments)     Bloody stool        Objective      Observation: pt presents in gym. No distress noted. Post op bandages in place. Incision sites and clean and dry. No s/s of infection. Replaced with bandaids    Gait: independent, mild decrease in knee ext in stance R      Range of Motion(*=pain):   Knee AROM PROM   Right 5-0-90 9-0-90   Left 15-0-140 15-0-140     Lower Extremity Strength  LLE 5/5 grossly  RLE: fair quad set. Unable to fully contract in full knee hyperextension    Joint Mobility: normal patella mobility     Palpation: tenderness along incision sites    Proximal/distal screen: full ankle and hip ROM    Sensation: intact BLE      Limitation/Restriction for FOTO  Survey    Therapist reviewed FOTO scores for Jacqui Nobles on 6/16/2023.   FOTO documents entered into EPIC - see Media section.    Limitation Score: see mdia         Treatment     Total Treatment time (time-based codes) separate from Evaluation: 20 minutes      Jacqui received the treatments listed below:      therapeutic exercises to develop strength and ROM for 20 minutes including:  See pt instructions for hep  Patient Education and Home Exercises     Education provided:   - educated in hep, prognosis, infection control    Written Home Exercises Provided: yes. Exercises were reviewed  and Jacqui was able to demonstrate them prior to the end of the session.  Jacqui demonstrated good  understanding of the education provided. See EMR under Patient Instructions for exercises provided during therapy sessions.    Assessment     Jacqui is a 42 y.o. female referred to outpatient Physical Therapy with a medical diagnosis of   M94.261 (ICD-10-CM) - Chondromalacia of right knee   M23.200 (ICD-10-CM) - Old tear of lateral meniscus of right knee, unspecified tear type   M23.203 (ICD-10-CM) - Old tear of medial meniscus of right knee, unspecified tear type   M23.91 (ICD-10-CM) - Internal derangement of right knee   . Patient presents with decreased ROM, weakness, pain, decreased functional mobility secondary to the above dx. Pt would benefit from skilled PT in order to maximize function.     Patient prognosis is Excellent.   Patient will benefit from skilled outpatient Physical Therapy to address the deficits stated above and in the chart below, provide patient /family education, and to maximize patientt's level of independence.     Plan of care discussed with patient: Yes  Patient's spiritual, cultural and educational needs considered and patient is agreeable to the plan of care and goals as stated below:     Anticipated Barriers for therapy: none    Medical Necessity is demonstrated by the following  History  Co-morbidities and personal factors that may impact the plan of care Co-morbidities:   none    Personal Factors:   none     low   Examination  Body Structures and Functions, activity limitations and participation restrictions that may impact the plan of care Body Regions:   lower extremities    Body Systems:    ROM  strength  balance  gait    Participation Restrictions:   none    Activity limitations:   Learning and applying knowledge  no deficits    General Tasks and Commands  no deficits    Communication  no deficits    Mobility  lifting and carrying objects  walking    Self care  no  deficits    Domestic Life  doing house work (cleaning house, washing dishes, laundry)    Interactions/Relationships  no deficits    Life Areas  no deficits    Community and Social Life  community life  recreation and leisure         low   Clinical Presentation stable and uncomplicated low   Decision Making/ Complexity Score: low     Goals:  Short Term Goals: 6 weeks   Pt independent in initial hep  Pain 0/10  Full AROM  Pt reports 50% improvement in function    Long Term Goals: 12-16 weeks   Pt independent in d/c hep  Pain 0/10  Quad strength 85% or better HHD or biodex  Pt reports 90% improvement in function.   Plan     Plan of care Certification: 6/16/2023 to 12/31/23.    Outpatient Physical Therapy 1 -3times weekly for 16 weeks to include the following interventions: Manual Therapy, Moist Heat/ Ice, Neuromuscular Re-ed, Patient Education, Therapeutic Activities, and Therapeutic Exercise.     Saeed Morataya, PT

## 2023-06-28 ENCOUNTER — CLINICAL SUPPORT (OUTPATIENT)
Dept: REHABILITATION | Facility: HOSPITAL | Age: 43
End: 2023-06-28
Attending: ORTHOPAEDIC SURGERY
Payer: COMMERCIAL

## 2023-06-28 ENCOUNTER — OFFICE VISIT (OUTPATIENT)
Dept: SPORTS MEDICINE | Facility: CLINIC | Age: 43
End: 2023-06-28
Payer: COMMERCIAL

## 2023-06-28 VITALS
BODY MASS INDEX: 25.5 KG/M2 | HEART RATE: 61 BPM | DIASTOLIC BLOOD PRESSURE: 73 MMHG | WEIGHT: 158 LBS | SYSTOLIC BLOOD PRESSURE: 107 MMHG

## 2023-06-28 DIAGNOSIS — M25.661 DECREASED ROM OF RIGHT KNEE: ICD-10-CM

## 2023-06-28 DIAGNOSIS — Z98.890 S/P RIGHT KNEE ARTHROSCOPY: Primary | ICD-10-CM

## 2023-06-28 DIAGNOSIS — R53.1 WEAKNESS: ICD-10-CM

## 2023-06-28 DIAGNOSIS — M94.261 CHONDROMALACIA OF RIGHT KNEE: ICD-10-CM

## 2023-06-28 DIAGNOSIS — M22.41 CHONDROMALACIA, PATELLA, RIGHT: ICD-10-CM

## 2023-06-28 DIAGNOSIS — R26.9 ABNORMAL GAIT: Primary | ICD-10-CM

## 2023-06-28 PROCEDURE — 1160F RVW MEDS BY RX/DR IN RCRD: CPT | Mod: CPTII,S$GLB,, | Performed by: PHYSICIAN ASSISTANT

## 2023-06-28 PROCEDURE — 1160F PR REVIEW ALL MEDS BY PRESCRIBER/CLIN PHARMACIST DOCUMENTED: ICD-10-PCS | Mod: CPTII,S$GLB,, | Performed by: PHYSICIAN ASSISTANT

## 2023-06-28 PROCEDURE — 3074F SYST BP LT 130 MM HG: CPT | Mod: CPTII,S$GLB,, | Performed by: PHYSICIAN ASSISTANT

## 2023-06-28 PROCEDURE — 3008F PR BODY MASS INDEX (BMI) DOCUMENTED: ICD-10-PCS | Mod: CPTII,S$GLB,, | Performed by: PHYSICIAN ASSISTANT

## 2023-06-28 PROCEDURE — 3008F BODY MASS INDEX DOCD: CPT | Mod: CPTII,S$GLB,, | Performed by: PHYSICIAN ASSISTANT

## 2023-06-28 PROCEDURE — 99024 PR POST-OP FOLLOW-UP VISIT: ICD-10-PCS | Mod: S$GLB,,, | Performed by: PHYSICIAN ASSISTANT

## 2023-06-28 PROCEDURE — 99024 POSTOP FOLLOW-UP VISIT: CPT | Mod: S$GLB,,, | Performed by: PHYSICIAN ASSISTANT

## 2023-06-28 PROCEDURE — 99999 PR PBB SHADOW E&M-EST. PATIENT-LVL III: ICD-10-PCS | Mod: PBBFAC,,, | Performed by: PHYSICIAN ASSISTANT

## 2023-06-28 PROCEDURE — 1159F PR MEDICATION LIST DOCUMENTED IN MEDICAL RECORD: ICD-10-PCS | Mod: CPTII,S$GLB,, | Performed by: PHYSICIAN ASSISTANT

## 2023-06-28 PROCEDURE — 3078F PR MOST RECENT DIASTOLIC BLOOD PRESSURE < 80 MM HG: ICD-10-PCS | Mod: CPTII,S$GLB,, | Performed by: PHYSICIAN ASSISTANT

## 2023-06-28 PROCEDURE — 97112 NEUROMUSCULAR REEDUCATION: CPT | Performed by: PHYSICAL THERAPIST

## 2023-06-28 PROCEDURE — 3078F DIAST BP <80 MM HG: CPT | Mod: CPTII,S$GLB,, | Performed by: PHYSICIAN ASSISTANT

## 2023-06-28 PROCEDURE — 97110 THERAPEUTIC EXERCISES: CPT | Performed by: PHYSICAL THERAPIST

## 2023-06-28 PROCEDURE — 99999 PR PBB SHADOW E&M-EST. PATIENT-LVL III: CPT | Mod: PBBFAC,,, | Performed by: PHYSICIAN ASSISTANT

## 2023-06-28 PROCEDURE — 3074F PR MOST RECENT SYSTOLIC BLOOD PRESSURE < 130 MM HG: ICD-10-PCS | Mod: CPTII,S$GLB,, | Performed by: PHYSICIAN ASSISTANT

## 2023-06-28 PROCEDURE — 1159F MED LIST DOCD IN RCRD: CPT | Mod: CPTII,S$GLB,, | Performed by: PHYSICIAN ASSISTANT

## 2023-06-28 NOTE — PROGRESS NOTES
Subjective:     Chief Complaint: Jacqui Nobles is a 42 y.o. female who had concerns including Post-op Evaluation of the Right Knee.    Patient presents to clinic for 2 week post op evaluation of right knee. Pain today is 0/10. Denies nausea, vomiting, fever, chills, CP, and SOB. She is attending formal PT at [facility] with JR and progressing as scheduled. She is no longer taking pain medication. She would like to discuss next step.    Date of Procedure: 6/13/2023      Procedure: 1. Right  Arthroscopy, debridement/shaving of articular cartilage (chondroplasty) 02474  2.  Right  Arthroscopy, with meniscectomy (medial OR lateral) 02044, Lateral  3.  Right  Arthroscopy, knee, synovectomy, limited 62330  4.  Right  Cartilage biopsy 16811     Surgeon(s) and Role:     * Skylar Palmer MD - Primary     Assisting Surgeon:      MD Chanel Cain  [facility]  It was medically necessary for Aaron Carlos MD to perform first assistant duties aiding in preparation and closure      Review of Systems   Constitutional: Negative. Negative for chills, fever, weight gain and weight loss.   HENT:  Negative for congestion and sore throat.    Eyes:  Negative for blurred vision and double vision.   Cardiovascular:  Negative for chest pain, leg swelling and palpitations.   Respiratory:  Negative for cough and shortness of breath.    Hematologic/Lymphatic: Does not bruise/bleed easily.   Skin:  Negative for itching, poor wound healing and rash.   Musculoskeletal:  Positive for joint pain and joint swelling. Negative for back pain, muscle weakness, myalgias and stiffness.   Gastrointestinal:  Negative for abdominal pain, constipation, diarrhea, nausea and vomiting.   Genitourinary: Negative.  Negative for frequency and hematuria.   Neurological:  Negative for dizziness, headaches, numbness, paresthesias and sensory change.   Psychiatric/Behavioral:  Negative for altered mental status and depression. The patient is not  nervous/anxious.    Allergic/Immunologic: Negative for hives.               Objective:     General: Jacqui is well-developed, well-nourished, appears stated age, in no acute distress, alert and oriented to time, place and person.     General    Vitals reviewed.  Constitutional: She is oriented to person, place, and time. She appears well-developed and well-nourished. No distress.   Cardiovascular:  Normal rate and regular rhythm.            Pulmonary/Chest: Effort normal. No respiratory distress.   Neurological: She is alert and oriented to person, place, and time.   Psychiatric: She has a normal mood and affect. Her behavior is normal. Thought content normal.           Right Knee Exam     Inspection   Erythema: absent  Scars: present  Swelling: present  Effusion: absent  Deformity: absent  Bruising: absent    Tenderness   The patient is experiencing no tenderness.     Range of Motion   Extension:  0 normal   Flexion:  130 normal     Tests   Meniscus   Marylin:  Medial - negative Lateral - negative  Ligament Examination   Lachman: abnormal - grade II  PCL-Posterior Drawer: normal (0 to 2mm)     MCL - Valgus: normal (0 to 2mm)  LCL - Varus: normal  Pivot Shift: abnormal- grade II    Other   Sensation: normal    Comments:  Portal sutures removed. No signs of infection or necrosis. No purulent drainage. NVI.    Left Knee Exam     Tests   Stability   Lachman: abnormal  - grade I  PCL-Posterior Drawer: normal (0 to 2mm)  MCL - Valgus: normal (0 to 2mm)  LCL - Varus: normal (0 to 2mm)  Pivot Shift: normal (Equal)    Other   Sensation: normal      Assessment:     Encounter Diagnoses   Name Primary?    S/P right knee arthroscopy Yes    Chondromalacia of right knee     Chondromalacia, patella, right         Plan:   1. Provided patient with operative note.  2. Removed portal sutures. No signs of infection.  3. May shower now without covering incisions.  4. Continue  mg once a day.  5. Continue PT per protocol.  6. RTC  in 4 weeks with Dr. Skylar Palmer for 6 week post op appt.  7. Dr. Palmer came into appt today and discussed next steps regarding her chondromalacia and insufficient ACL in great detail.   He recommends a total knee replacement. Patient is not interested in this at this time. He discussed ACL reconstruction and DFO to stop further progression of chondromalacia. He discussed DAVID vs osteochondral allograft but explained the risks of these procedures with failing with as much diffuse cartilage wear that is present in her knee.  8. Information provided on all of these procedures. She will review this and discuss next step at next appt.in 4 weeks.          Patient questionnaires may have been collected.

## 2023-06-28 NOTE — PROGRESS NOTES
[facility]   Physical Therapy Treatment Note      Name: Jacqui Nobles  Clinic Number: [id number]    Therapy Diagnosis:   Encounter Diagnoses   Name Primary?    Abnormal gait Yes    Weakness     Decreased ROM of right knee      Physician: Skylar Palmer MD    Visit Date: 6/28/2023    Physician Orders: PT Eval and Treat   Medical Diagnosis from Referral:   M94.261 (ICD-10-CM) - Chondromalacia of right knee   M23.200 (ICD-10-CM) - Old tear of lateral meniscus of right knee, unspecified tear type   M23.203 (ICD-10-CM) - Old tear of medial meniscus of right knee, unspecified tear type   M23.91 (ICD-10-CM) - Internal derangement of right knee      Evaluation Date: 6/16/2023  Authorization Period Expiration: 7/16/23  Plan of Care Expiration: 12/31/23  Progress Note Due: 7/20/23  Visit # / Visits authorized: 1/20  FOTO: 0/0    PTA Visit #: 0/5     Time In: 1500  Time Out: 1615  Total Billable Time: 53 minutes    Subjective     Pt reports: pt states she is doing well. Some swelling still along lateral portal site. Feels that ROM is progressing well.  She was compliant with home exercise program.  Response to previous treatment: na  Functional change: n/a    Pain: 1/10  Location: right knee      Objective      Objective Measures updated at progress report unless specified.   Range of Motion(*=pain):  6/28/23  Knee AROM PROM   Right 5-0-115 9-0-130   Left 15-0-140 15-0-140      Treatment     Jacqui received the treatments listed below:      therapeutic exercises to develop strength and ROM for 15 minutes including:  Standing TKE red power band  Standing mini Maori squat red power band  Standing TKE single leg orange power band    manual therapy techniques: Joint mobilizations were applied to the: R patella for 3 minutes, including:  Superior patella mob    neuromuscular re-education activities to improve: muscle re ed for 25 minutes. The following activities were included:  QS towel with strap 10  x 10 seconds multiiple rounds  SAQ blue half roll 10 x 10 sec with strap  SLR 2 lbs 3 x 10  SLR 3 lbs 5/5  SLR 1 x15    Single leg balanc 1 x 1 mins  Single leg balance tilt board 3 x 1 min  Mini squat hold tilt board 3 x 1 mins    therapeutic activities to improve functional performance for   minutes, including:    hot pack for  minutes to .    cold pack for  minutes to .    Patient Education and Home Exercises       Education provided:   - reviewed hep, need for full active hyper extension    Written Home Exercises Provided: yes. Exercises were reviewed and Jacqui was able to demonstrate them prior to the end of the session.  Jacqui demonstrated good  understanding of the education provided. See EMR under Patient Instructions for exercises provided during therapy sessions    Assessment     ROM progressing well. Good quad engagement, swelling limiting ability to get full active hyper extension but should progress as swelling improves.     Jacqui Is progressing well towards her goals.   Pt prognosis is Excellent.     Pt will continue to benefit from skilled outpatient physical therapy to address the deficits listed in the problem list box on initial evaluation, provide pt/family education and to maximize pt's level of independence in the home and community environment.     Pt's spiritual, cultural and educational needs considered and pt agreeable to plan of care and goals.     Anticipated barriers to physical therapy: none  Goals:  Short Term Goals: 6 weeks   Pt independent in initial hep  Pain 0/10  Full AROM  Pt reports 50% improvement in function     Long Term Goals: 12-16 weeks   Pt independent in d/c hep  Pain 0/10  Quad strength 85% or better HHD or biodex  Pt reports 90% improvement in function.   Plan      Plan of care Certification: 6/16/2023 to 12/31/23.     Outpatient Physical Therapy 1 -3times weekly for 16 weeks to include the following interventions: Manual Therapy, Moist Heat/ Ice, Neuromuscular  Re-ed, Patient Education, Therapeutic Activities, and Therapeutic Exercise.     Saeed Morataya, PT

## 2023-06-28 NOTE — PATIENT INSTRUCTIONS
The knee is the most frequently injured joint in the body. Most injuries are due to the extreme stresses during twisting or turning activities or sports. The knee joint is made up of three bones, four major ligaments and two types of cartilage.    FEMUR (Thigh Bone)  The femoral condyles are the two rounded prominences at the end of the femur. The motion of the condyles include rocking, gliding and rotating. Any abnormal surface structure or cartilage damage can lead to cartilage breakdown and arthritis (loss of cartilage padding).    TIBIA (Shin Bone)  The Tibia meets the Femur at the knee in two areas on which the Femur rides. This area is called the Tibial Plateau.    PATELLA (Knee Cap)  The Patella is a bone that lies within the quadriceps tendon. It rides in the shallow groove over the front part if the Femur called the Trochlea. The Patella acts as a lever arm to help the quadriceps muscle extend the knee.    Articular Cartilage covers the ends of these bones at the knee joint. This glistening white substance has the consistency of firm rubber but is actually a mixture of collagen and special large sponge-like molecules all maintained by living cartilage cells (chondrocytes). With normal joint fluid for lubrication, the surface is more slippery than ice on ice and allows smooth and easy knee joint motion.      MENISCUS  The other type of knee cartilage is the Meniscal Cartilage (fibrocartilage). These C-shaped pads are found between the thigh bone and shin bone -- one on each side -- thus called the Medial Meniscus (inner thigh aspect) and Lateral Meniscus (outer aspect). The menisci are attached to the Tibial Plateaus, and serve to cushion and transfer joint force more evenly to the tibia. They accomplish this by distributing joint forces over a larger area of the joint -- transferring force from the curved femoral condylar margins to the flatter tibial plateaus. Damage to the meniscal cushion may result  "in increased stress on the articular cartilage of the tibia and femur and early arthritis.      The smooth, white shiny covering of the bones in the joint is known as Articular Cartilage, and is made of a material called "hyaline cartilage". Damage to this articular cartilage can occur from trauma (such as a fall or car accident), but more often, it is the result of repetitive injuries over a long period of time.    Articular cartilage injuries are common, occurring in 20- 70% of knee injuries. The function of articular cartilage is to optimize joint function by reducing friction and increasing shock absorption. Articular cartilage is similar to the "tread on a car tire".    Injuries to the articular cartilage have a low capacity for healing. Both superficial and full-thickness cartilage injuries may progress to the mechanical wear and breakdown of the cartilage matrix.  The breakdown of articular cartilage will eventually cause osteoarthritis, which is a very serious painful condition. With a full-thickness cartilage injury, continued activity may cause the articular cartilage injury to progress rapidly to traumatic arthritis. The larger the initial cartilage injury, the faster the potential progression of arthritis. Articular cartilage injury or wear leads to joint pain.      Common symptoms include pain when the joint is moved or loaded (like walking or running). Catching, locking, or creaking (crepitation) may occur with joint motion or weight bearing (like twisting or standing  from a seated position). Articular cartilage damage may be superficial (partial), deep (complete full-thickness), or osteochondral (bone and cartilage).    Superficial cartilage injuries extend into the upper 50% of the depth of the cartilage. These injuries typically do not heal, but may not progress to arthritis unless they are large and located in a weight-bearing area of the knee.  Deep or full-thickness lesions extend down to the " "bone, but not through it. These injuries have very little healing potential and tend to progress to osteoarthritis if located in a weight-bearing area.    Osteochondral (bone plus cartilage) injuries extend down through the subchondral bone (deep to the cartilage). These injuries may heal by allowing blood vessel ingrowth with fibrous cells to produce a fibrous (scar-like) tissue repair.      Treatment Options For Smaller Defects  Most studies suggest the repair tissue formed from bone marrow stimulation techniques is fibrocartilage (scar tissue -not hyaline cartilage), which is less resistant to wear with the forces in the knee joint and often deteriorates over time Bone marrow stimulation techniques can be successful in eliminating symptoms in many patients, especially young patients with small lesions.   Without early intervention, cartilage degeneration may proceed, and prevent a chance for successful pain- free function.    Arthroscopic Debridement  This is an arthroscopic procedure, often known as a "knee scope". The surgeon uses minimally invasive techniques with a small fiberoptic light source attached to a video camera to locate damaged cartilage and trim the loose edges away to smooth the surface. The surgeon may trim meniscus tears and remove loose cartilage that causes catching or locking symptoms and attempts to prevent any further flaking off that can irritate the knee joint lining and cause swelling.  Arthroscopic debridement is most effective for small lesions, less than 1 cm2 (3/8 inch). It is not as effective for larger lesions because it does not fill the lesion with any repair tissue, leaving the edges exposed to high forces in the knee and continued wear. Despite relieving some symptoms from cartilage tears or loss, arthroscopic cleaning does not prevent the progression of arthritis, but may relieve mechanical symptoms.    Bone Marrow Stimulation Techniques  Three different techniques are " "available to create bleeding and clot formation at the cartilage defect. Blood vessels and fibrous cells migrate to the area to form a fibrous scar-tissue repair tissue to fill the hole in the articular cartilage. Drilling creates multiple small holes through the subchondral bone to allow bleeding into the defect.   Microfracture or "picking" creates small fractures in the subchondral bone to encourage bleeding into the defect.      Abrasion arthroplasty is a superficial shaving of the subchondral bone surface to create bleeding into the defect. Bone marrow stimulation techniques are successful in eliminating symptoms in many patients, especially younger patients with smaller lesions well contained lesions.       For patients with more extensive cartilage damage there are several techniques available    Osteochondral Autograft  This technique is analogous to a hair-plug transfer. The surgeon removes a small section of the patient's own cartilage along with the underlying bone plug, hence the name osteochondral (bone and cartilage) graft.      Bone and cartilage cylinders are harvested from a minor weight bearing area of the knee joint and transplanted into prepared holes in the damage area. This process works much like a hair transplant. Unfortunately, a limited amount of normal osteochondral tissue is available for harvest. The typical size of defect treatable with this method is between 1 to 2 cm2.      Treatment Options For Larger Lesions    Autologous Chondrocyte Implantation (ACI) Carticel  For cartilage defects greater than 2 square centimeters or if there are multiple defects in the knee, one of the newer techniques for the cartilage regeneration, is ACI. ACI is FDA indicated for full-thickness cartilage or osteochondral lesions located in the knee.    ACI is performed in two stages. The first stage is performed when initially assessing the joint arthroscopically. A small amount of cartilage is harvested and " sent to a laboratory for cell culture and growth.    The patient's own cartilage cells (chondrocytes) are grown in culture increasing the number of cells by 10 fold.    Twelve million chondrocytes are then re-implanted into the cartilage defect and covered with a ceiling of native tissue (periosteum).    The cells then grow to fill the defect and resurface areas of cartilage loss with hyaline-like cartilage.    The long-term outcomes (of 14 years) are encouraging for treating medium and large cartilage lesions. ACI is the only procedure with a formal patient outcomes registry.    Osteochondral Allograft     For larger defects that involve both cartilage and bone loss, surgeons may custom fit the defect with a cadaver implant of donated cartilage and bone. The transplanted tissue is matched to the patient based on size of the knee, but tissue typing (similar to organ transplantation) is not necessary. Osteochondral transplantation may allow restoration of the joint surface. The long-term outcomes with fresh allograft (cadaver) tissue have been very encouraging.      RESTORING THE MENISCUS  Our goal is to maintain normal anatomy, if possible. In the case of meniscal tears, the first line of treatment is attempt at repair. Historically, in the days of open cartilage surgery, the entire meniscus was removed. Unfortunately, long term follow-up studies of these patients after removal of the meniscus have found that many go on to degenerative arthritis. Today, cartilage surgeons recognize the protective value of the meniscal cartilage and make every attempt to conserve this valuable tissue.    To maximally preserve function after a meniscus tear, surgeons may repair the meniscus using a variety of techniques. Options include using special sutures or absorbable implants to staple, daysi, or otherwise fix and secure the torn meniscal cushion.    Replacing The Meniscus  For patients who have had the meniscus removed, an  innovative solution called a meniscal transplant may be an option. The indications for transplant need to be assessed by your cartilage surgeon. Unlike some other forms of tissue transplantation, this procedure does not require patients to be on medications to prevent organ rejection. Intermediate term outcome studies are encouraging.        RESTORING KNEE ALIGNMENT    Osteotomy  When the bones of the knee do not align properly, joint forces are not evenly distributed and may overload one side of the knee causing pain and cartilage degeneration. This overload problem is made worse when there has been a traumatic cartilage injury or the meniscus has been removed.    An osteotomy is designed to shift the stress from the damaged part of the knee to a more normal area in the knee. An osteotomy requires the surgeon to cut the bone in order to remove a wedge of bone in order to adjust the angle of the knee. For individuals with medial compartment narrowing (the most common situation), there are three options for high tibial osteotomy (HTO).      One involves removing a wedge shape piece of bone from the lateral side of the tibia and then closing the remaining surfaces together and holding it with a plate and screws (Fig A).    The second technique involves making one cut partially across the top of the tibia and opening the bone to establish the correct alignment. This is held in place with a plate and screws and a bone graft. (Fig B)    The final technique is called medial hemicallotasis, which means stretching healing bone. This technique requires a single cut partially across the bone. The bone is then gradually opened on the medial side by an external fixation frame. This technique is attractive because it allows adjustments to be made in the angle of correction and the hardware is removed three months after the procedure (Fig C).      Each of these techniques require a period of non-weightbearing or partial  "weightbearing crutch ambulation. These techniques may be necessary at the same time or prior to other reconstructive procedures such as cartilage replacement or meniscus replacement surgery in order to remove excessive forces off the repair site.      OSTEOARTHRITIS    Partial (Unicompartmental) Joint Replacement  This procedure replaces only the damaged portion of the joint with metal and/or plastic, leaving the remaining portion of the joint intact. It is often known as a partial knee replacement. New techniques allow replacement of a portion of the knee joint through smaller incisions with fewer days of hospitalization required.    Total Joint Replacement  If there is extensive damage with bone-on-bone apposition, many of the above procedures are not indicated. In these cases of end-stage arthritis, the entire joint surface is replaced with artificial metal and plastic components, allowing most patients to return to pain-free activities.    Future Trends  Investigators are now examining the potential of using collagen or other biologic tissues to serve as a bridge or scaffold for the body's own healing/repair mechanism to use in reestablishing meniscal form and function. In the future, biological "healing glues" may become available to allow repair without sutures. Even with the newest techniques available, certain tears are not repairable and a portion of the meniscus is removed using the arthroscope (partial meniscectomy).    The term osteoarthritis indicates the degeneration and excessive wear of articular cartilage with gradual changes occurring in the underlying bone.    Initially the articular cartilage softens, the surface becomes uneven and the cartilage frays and develops cracks, which can extend down to bone.  In advanced osteoarthritis the cartilage is worn away to reveal the underlying bone and nerve endings causing pain.  The bone hardens, cysts begin to form, and new cartilage cells laid down around " the worn cartilage ossify and form bony projections (bone spurs).  Untreated articular cartilage defects can progress to osteoarthritis with both mechanical and enzymatic breakdown resulting in permanent dysfunction of the joint. Our goal is to diagnose and prevent or halt the progression of arthritis      Many patients suffer with end-stage arthritis that limits even the simplest activities of daily living, significantly altering the patient's quality of fife. For these patient's, current cartilage surgical options DO NOT apply. There are no curative therapies for end-stage arthritis. A wide range of methods may be used to relieve pain and restore function. Conventional treatment methods include exercise, physical therapy and medications, such as anti-inflammatories. Recently, new biological compounds have been shown to be effective and safe for treating arthritis. These compounds include lubricants (hyaluronic acid) and building blocks of cartilage (Chondroitin Sulfate and Glucosamine).    Medications  Oral medications such as non-steroidal anti-inflammatories (NSAID's) tend to have a beneficial effect on the degenerative conditions described above. Immediately following an injury, these medications help to decrease pain and swelling. On a more long term nature, these medications help to relieve the pain and swelling associated with arthritic joints. Newer specific anti-inflammatories medications have been developed to block the enzymes necessary for production of inflammation (cyclooxygenase-2 or ALCARAZ-2). These medications, such as Ceibrex or Bextra tend to have less adverse effects on the stomach and gastrointestinal tract than older, more traditional NSAID's. These prescription-strength NSAID medications are taken by mouth once or twice per day. Other non-prescription over-the-counter NSAID's are available.    Cartilage Supplements  Other oral medications which can be purchased without a prescription include  Glucosamine and Chondroitin Sulfate. These two compounds are normally found in the substance of articular cartilage. Several recent studies using Cosamin®DS have demonstrated a pain relieving effect with the combination of Glucosamine Hydrochloride, highly purified low molecular weight Chondroitin Sulfate and Manganese Ascorbate available only in this product. The National Institutes of Health ([facility]) has selected the exact same Glucosamine and Chondroitin Sulfate used in CosaminDS for a large multi-center clinical trial currently in progress.      Oral administration of these compounds does not have a cartilage building effect, but rather a cartilage sparing effect. Laboratory studies have confirmed a stimulatory action on cartilage cells as well as an inhibition of cartilage degeneration with CosaminDS, which can improve the health of existing cartilage. Few negative side-effects have been demonstrated in patients taking these compounds, and many patients with arthritis benefit from the addition of this supplement to their arthritis treatment regimen.    Cortisone (Steroid) Injections  Injection of steroids into joints have been performed for well over 100 years with good results. Typically, steroid injection is utilized in a patient with degenerative arthritic changes to treat acute inflammation.  Steroids are powerful anti-inflammatory substances. When injected into a joint, the steroid has primarily a local effect, not a whole-body or systemic effect. These medications tend to decrease pain and inflammation when used appropriately. If overused, local steroid injection can have a negative effect on the tissues, such as weakening of bone and tendons. These injections typically are not permanent in their beneficial effect.    Injectable Viscosupplementation  The space between the cartilage surfaces in the knee joint contains synovial fluid that acts as a lubricant for the joint. With the progression of arthritis,  "the synovial fluid becomes thinner and is ineffective as a shock absorber. As a result simple movements become painful. Hyaluronic acid injections supplement the existing synovial fluid and act as a shock absorber and lubricant for the knee.      Synvisc is a hyluronan based, naturally occurring lubricant with similar properties to synovial fluid found in healthy joints. Synvisc or Euflexxa are injected over a 3 week series to provide lubrication to the knee joint. For some patients, Synvisc One may be an option, this is a single-shot injection.    Braces  In some cases of arthritis, only a portion of the knee is degenerative and it may be advantageous to "unload" the affected area and force more weight towards the "good" part of the knee. Special braces called "unloaders" are used for this purpose. Typically the brace is worn for work or activity and tends to decrease the pain caused by single compartment arthritis.        Physical Therapy  Exercise is a vital component of the treatment of cartilage injury. If the knee becomes stiff after an injury or over the course of time, it may be difficult to regain the lost motion. In most cases, early range of motion exercises are instituted in order to prevent this problem. In some cases, a loss of strength in certain muscle groups can lead to increased stress on the already degenerative articular surfaces. If muscles are strong and working properly they will take up some of the stress and divert it away from the cartilage surfaces. As symptoms decrease exercise is increased, but always below the pain threshold. Muscle strength is never harmful to a joint. It is therefore common to have a doctor prescribe strengthening exercises as an integral part of the treatment program for arthritis.    TECHNIQUES  Biologic tissue engineering is continuing to bring exciting new approaches from the lab to the clinical arena. It may be possible to induce primitive cells from the bone " "marrow called pluripotential cells or mesenchymal stem cells to transform into hyaline articular cartilage with the use of a variety of growth factors or hormones. Genetic reprogram¬marvin and tissue engineering may eventually replace surgery - but not at this stage in the new millennium.  Other biological patches may act as a temporary home for chondrocytes or "prechondrocytes." This exciting field will offer many new surgical techniques, which will hopefully lead to opportunities to restore function with less invasive means.   "

## 2023-08-09 ENCOUNTER — PATIENT MESSAGE (OUTPATIENT)
Dept: RESEARCH | Facility: HOSPITAL | Age: 43
End: 2023-08-09
Payer: COMMERCIAL

## 2023-08-18 ENCOUNTER — TELEPHONE (OUTPATIENT)
Dept: SPORTS MEDICINE | Facility: HOSPITAL | Age: 43
End: 2023-08-18
Payer: COMMERCIAL

## 2023-08-18 NOTE — TELEPHONE ENCOUNTER
Called patient to get 6wk appointment rescheduled. She stated that she is currently getting other opinions before she decides what she wants to do. She doesn't want to reschedule at this time so I let her know to call once she is ready.

## (undated) DEVICE — TUBE SET INFLOW/OUTFLOW

## (undated) DEVICE — SOL NACL IRR 3000ML

## (undated) DEVICE — NDL SAFETY 22G X 1.5 ECLIPSE

## (undated) DEVICE — NDL 22GA X1 1/2 REG BEVEL

## (undated) DEVICE — APPLICATOR CHLORAPREP ORN 26ML

## (undated) DEVICE — TOURNIQUET SB QC DP 34X4IN

## (undated) DEVICE — ELECTRODE 90 DEGREE ANGLE

## (undated) DEVICE — DRESSING XEROFORM 1X8IN

## (undated) DEVICE — GLOVE BIOGEL SKINSENSE PI 8.5

## (undated) DEVICE — UNDERGLOVES BIOGEL PI SZ 7 LF

## (undated) DEVICE — SYR 30CC LUER LOCK

## (undated) DEVICE — GAUZE SPONGE 4X4 12PLY

## (undated) DEVICE — DRAPE TOP 53X102IN

## (undated) DEVICE — DRESSING XEROFORM NONADH 1X8IN

## (undated) DEVICE — SUT ETHILON 4-0 PS2 18 BLK

## (undated) DEVICE — GLOVE BIOGEL SKINSENSE PI 7.0

## (undated) DEVICE — PAD ABD 8X10 STERILE

## (undated) DEVICE — Device

## (undated) DEVICE — MARKER SKIN RULER STERILE

## (undated) DEVICE — KIT BIOPSY CARTLIAGE

## (undated) DEVICE — PADDING CAST 6IN DELTA ROLL

## (undated) DEVICE — GOWN ECLIPSE REINF LV4 XLNG XL

## (undated) DEVICE — BNDG COFLEX FOAM LF2 ST 6X5YD

## (undated) DEVICE — UNDERGLOVES BIOGEL PI SIZE 8.5

## (undated) DEVICE — WRAP KNEE ACCU THERM GEL PACK

## (undated) DEVICE — GLOVE BIOGEL SKINSENSE PI 7.5

## (undated) DEVICE — PAD ELECTRODE STER 1.5X3

## (undated) DEVICE — GOWN ECLIPSE REINF L4 XLNG XXL

## (undated) DEVICE — DRAPE ARTHSCP T ORTHOMAX POUCH

## (undated) DEVICE — DRAPE STERI U-SHAPED 47X51IN

## (undated) DEVICE — GOWN SMARTSLEEVE XXL/XLONG

## (undated) DEVICE — PAD ABDOMINAL STERILE 8X10IN

## (undated) DEVICE — PAD CAST SPECIALIST STRL 6

## (undated) DEVICE — SUT 4-0 ETHILON 18 PS-2

## (undated) DEVICE — DRAPE SURG W/TWL 17 5/8X23

## (undated) DEVICE — SHAVER ULTRAFFR 4.2MM

## (undated) DEVICE — DRAPE STERI INSTRUMENT 1018